# Patient Record
Sex: MALE | Race: WHITE | NOT HISPANIC OR LATINO | Employment: OTHER | URBAN - METROPOLITAN AREA
[De-identification: names, ages, dates, MRNs, and addresses within clinical notes are randomized per-mention and may not be internally consistent; named-entity substitution may affect disease eponyms.]

---

## 2018-07-23 ENCOUNTER — TRANSCRIBE ORDERS (OUTPATIENT)
Dept: ADMINISTRATIVE | Facility: HOSPITAL | Age: 83
End: 2018-07-23

## 2018-07-23 ENCOUNTER — APPOINTMENT (OUTPATIENT)
Dept: LAB | Facility: HOSPITAL | Age: 83
End: 2018-07-23
Attending: INTERNAL MEDICINE
Payer: COMMERCIAL

## 2018-07-23 DIAGNOSIS — I10 HYPERTENSION, UNSPECIFIED TYPE: ICD-10-CM

## 2018-07-23 DIAGNOSIS — I10 HYPERTENSION, UNSPECIFIED TYPE: Primary | ICD-10-CM

## 2018-07-23 DIAGNOSIS — E78.5 HYPERLIPIDEMIA, UNSPECIFIED HYPERLIPIDEMIA TYPE: ICD-10-CM

## 2018-07-23 LAB
ALBUMIN SERPL BCP-MCNC: 4 G/DL (ref 3.5–5.7)
ALP SERPL-CCNC: 48 U/L (ref 55–165)
ALT SERPL W P-5'-P-CCNC: 9 U/L (ref 7–52)
ANION GAP SERPL CALCULATED.3IONS-SCNC: 7 MMOL/L (ref 4–13)
AST SERPL W P-5'-P-CCNC: 13 U/L (ref 13–39)
BILIRUB SERPL-MCNC: 0.7 MG/DL (ref 0.2–1)
BUN SERPL-MCNC: 18 MG/DL (ref 7–25)
CALCIUM SERPL-MCNC: 9.2 MG/DL (ref 8.6–10.5)
CHLORIDE SERPL-SCNC: 105 MMOL/L (ref 98–107)
CHOLEST SERPL-MCNC: 267 MG/DL (ref 0–200)
CO2 SERPL-SCNC: 27 MMOL/L (ref 21–31)
CREAT SERPL-MCNC: 0.98 MG/DL (ref 0.7–1.3)
ERYTHROCYTE [DISTWIDTH] IN BLOOD BY AUTOMATED COUNT: 13.8 % (ref 11.6–15.1)
GFR SERPL CREATININE-BSD FRML MDRD: 71 ML/MIN/1.73SQ M
GLUCOSE P FAST SERPL-MCNC: 88 MG/DL (ref 65–99)
HCT VFR BLD AUTO: 46.8 % (ref 42–52)
HDLC SERPL-MCNC: 57 MG/DL (ref 40–60)
HGB BLD-MCNC: 15.9 G/DL (ref 12–17)
LDLC SERPL CALC-MCNC: 188 MG/DL (ref 0–100)
LG PLATELETS BLD QL SMEAR: PRESENT
MACROCYTES BLD QL AUTO: PRESENT
MCH RBC QN AUTO: 37.1 PG (ref 26.8–34.3)
MCHC RBC AUTO-ENTMCNC: 34.1 G/DL (ref 31.4–37.4)
MCV RBC AUTO: 109 FL (ref 82–98)
NONHDLC SERPL-MCNC: 210 MG/DL
PLATELET # BLD AUTO: 263 THOUSANDS/UL (ref 149–390)
PLATELET BLD QL SMEAR: ADEQUATE
PMV BLD AUTO: 7.3 FL (ref 8.9–12.7)
POTASSIUM SERPL-SCNC: 4.6 MMOL/L (ref 3.5–5.5)
PROT SERPL-MCNC: 6.6 G/DL (ref 6.4–8.9)
RBC # BLD AUTO: 4.3 MILLION/UL (ref 3.88–5.62)
RBC MORPH BLD: PRESENT
SODIUM SERPL-SCNC: 139 MMOL/L (ref 134–143)
TRIGL SERPL-MCNC: 111 MG/DL (ref 44–166)
WBC # BLD AUTO: 6.9 THOUSAND/UL (ref 4.31–10.16)

## 2018-07-23 PROCEDURE — 80053 COMPREHEN METABOLIC PANEL: CPT

## 2018-07-23 PROCEDURE — 80061 LIPID PANEL: CPT

## 2018-07-23 PROCEDURE — 36415 COLL VENOUS BLD VENIPUNCTURE: CPT

## 2018-07-23 PROCEDURE — 85027 COMPLETE CBC AUTOMATED: CPT

## 2018-09-12 ENCOUNTER — APPOINTMENT (OUTPATIENT)
Dept: LAB | Facility: HOSPITAL | Age: 83
End: 2018-09-12
Attending: INTERNAL MEDICINE
Payer: COMMERCIAL

## 2018-09-12 ENCOUNTER — TRANSCRIBE ORDERS (OUTPATIENT)
Dept: ADMINISTRATIVE | Facility: HOSPITAL | Age: 83
End: 2018-09-12

## 2018-09-12 DIAGNOSIS — N39.0 URINARY TRACT INFECTION WITHOUT HEMATURIA, SITE UNSPECIFIED: Primary | ICD-10-CM

## 2018-09-12 DIAGNOSIS — N39.0 URINARY TRACT INFECTION WITHOUT HEMATURIA, SITE UNSPECIFIED: ICD-10-CM

## 2018-09-12 LAB
BACTERIA UR QL AUTO: ABNORMAL /HPF
BILIRUB UR QL STRIP: NEGATIVE
CLARITY UR: ABNORMAL
COLOR UR: YELLOW
GLUCOSE UR STRIP-MCNC: NEGATIVE MG/DL
HGB UR QL STRIP.AUTO: ABNORMAL
KETONES UR STRIP-MCNC: NEGATIVE MG/DL
LEUKOCYTE ESTERASE UR QL STRIP: ABNORMAL
NITRITE UR QL STRIP: NEGATIVE
NON-SQ EPI CELLS URNS QL MICRO: ABNORMAL /HPF
PH UR STRIP.AUTO: 6 [PH] (ref 5–8)
PROT UR STRIP-MCNC: NEGATIVE MG/DL
RBC #/AREA URNS AUTO: ABNORMAL /HPF
SP GR UR STRIP.AUTO: 1.01 (ref 1–1.03)
UROBILINOGEN UR QL STRIP.AUTO: 0.2 E.U./DL
WBC #/AREA URNS AUTO: ABNORMAL /HPF

## 2018-09-12 PROCEDURE — 87186 SC STD MICRODIL/AGAR DIL: CPT

## 2018-09-12 PROCEDURE — 81001 URINALYSIS AUTO W/SCOPE: CPT | Performed by: INTERNAL MEDICINE

## 2018-09-12 PROCEDURE — 87077 CULTURE AEROBIC IDENTIFY: CPT

## 2018-09-12 PROCEDURE — 87086 URINE CULTURE/COLONY COUNT: CPT

## 2018-09-14 LAB — BACTERIA UR CULT: ABNORMAL

## 2018-09-27 ENCOUNTER — TRANSCRIBE ORDERS (OUTPATIENT)
Dept: ADMINISTRATIVE | Facility: HOSPITAL | Age: 83
End: 2018-09-27

## 2018-09-27 ENCOUNTER — APPOINTMENT (OUTPATIENT)
Dept: LAB | Facility: HOSPITAL | Age: 83
End: 2018-09-27
Attending: INTERNAL MEDICINE
Payer: COMMERCIAL

## 2018-09-27 DIAGNOSIS — I10 ESSENTIAL HYPERTENSION: ICD-10-CM

## 2018-09-27 DIAGNOSIS — N40.0 BENIGN PROSTATIC HYPERPLASIA, UNSPECIFIED WHETHER LOWER URINARY TRACT SYMPTOMS PRESENT: ICD-10-CM

## 2018-09-27 DIAGNOSIS — I10 ESSENTIAL HYPERTENSION: Primary | ICD-10-CM

## 2018-09-27 LAB
ALBUMIN SERPL BCP-MCNC: 3.8 G/DL (ref 3.5–5.7)
ALP SERPL-CCNC: 52 U/L (ref 55–165)
ALT SERPL W P-5'-P-CCNC: 10 U/L (ref 7–52)
ANION GAP SERPL CALCULATED.3IONS-SCNC: 8 MMOL/L (ref 4–13)
AST SERPL W P-5'-P-CCNC: 13 U/L (ref 13–39)
BASOPHILS # BLD AUTO: 0.1 THOUSANDS/ΜL (ref 0–0.1)
BASOPHILS NFR BLD AUTO: 1 % (ref 0–1)
BILIRUB SERPL-MCNC: 0.7 MG/DL (ref 0.2–1)
BUN SERPL-MCNC: 17 MG/DL (ref 7–25)
CALCIUM SERPL-MCNC: 9.2 MG/DL (ref 8.6–10.5)
CHLORIDE SERPL-SCNC: 104 MMOL/L (ref 98–107)
CO2 SERPL-SCNC: 25 MMOL/L (ref 21–31)
CREAT SERPL-MCNC: 0.98 MG/DL (ref 0.7–1.3)
EOSINOPHIL # BLD AUTO: 0.3 THOUSAND/ΜL (ref 0–0.61)
EOSINOPHIL NFR BLD AUTO: 5 % (ref 0–6)
ERYTHROCYTE [DISTWIDTH] IN BLOOD BY AUTOMATED COUNT: 13.5 % (ref 11.6–15.1)
GFR SERPL CREATININE-BSD FRML MDRD: 71 ML/MIN/1.73SQ M
GLUCOSE P FAST SERPL-MCNC: 93 MG/DL (ref 65–99)
HCT VFR BLD AUTO: 45.2 % (ref 42–52)
HGB BLD-MCNC: 15.3 G/DL (ref 12–17)
LYMPHOCYTES # BLD AUTO: 2.9 THOUSANDS/ΜL (ref 0.6–4.47)
LYMPHOCYTES NFR BLD AUTO: 43 % (ref 14–44)
MACROCYTES BLD QL AUTO: PRESENT
MCH RBC QN AUTO: 36.3 PG (ref 26.8–34.3)
MCHC RBC AUTO-ENTMCNC: 33.8 G/DL (ref 31.4–37.4)
MCV RBC AUTO: 107 FL (ref 82–98)
MONOCYTES # BLD AUTO: 0.5 THOUSAND/ΜL (ref 0.17–1.22)
MONOCYTES NFR BLD AUTO: 7 % (ref 4–12)
NEUTROPHILS # BLD AUTO: 3 THOUSANDS/ΜL (ref 1.85–7.62)
NEUTS SEG NFR BLD AUTO: 44 % (ref 43–75)
NRBC BLD AUTO-RTO: 0 /100 WBCS
PLATELET # BLD AUTO: 264 THOUSANDS/UL (ref 149–390)
PLATELET BLD QL SMEAR: ADEQUATE
PMV BLD AUTO: 7.2 FL (ref 8.9–12.7)
POTASSIUM SERPL-SCNC: 4 MMOL/L (ref 3.5–5.5)
PROT SERPL-MCNC: 6.5 G/DL (ref 6.4–8.9)
PSA SERPL-MCNC: 0.4 NG/ML (ref 0–4)
RBC # BLD AUTO: 4.21 MILLION/UL (ref 3.88–5.62)
RBC MORPH BLD: PRESENT
SODIUM SERPL-SCNC: 137 MMOL/L (ref 134–143)
WBC # BLD AUTO: 6.7 THOUSAND/UL (ref 4.31–10.16)

## 2018-09-27 PROCEDURE — 85025 COMPLETE CBC W/AUTO DIFF WBC: CPT

## 2018-09-27 PROCEDURE — 80053 COMPREHEN METABOLIC PANEL: CPT

## 2018-09-27 PROCEDURE — 84153 ASSAY OF PSA TOTAL: CPT

## 2018-09-27 PROCEDURE — 36415 COLL VENOUS BLD VENIPUNCTURE: CPT

## 2018-10-05 ENCOUNTER — APPOINTMENT (OUTPATIENT)
Dept: LAB | Facility: HOSPITAL | Age: 83
End: 2018-10-05
Attending: INTERNAL MEDICINE
Payer: COMMERCIAL

## 2018-10-05 ENCOUNTER — TRANSCRIBE ORDERS (OUTPATIENT)
Dept: ADMINISTRATIVE | Facility: HOSPITAL | Age: 83
End: 2018-10-05

## 2018-10-05 DIAGNOSIS — N39.0 URINARY TRACT INFECTION WITHOUT HEMATURIA, SITE UNSPECIFIED: Primary | ICD-10-CM

## 2018-10-05 DIAGNOSIS — N39.0 URINARY TRACT INFECTION WITHOUT HEMATURIA, SITE UNSPECIFIED: ICD-10-CM

## 2018-10-05 LAB
BACTERIA UR QL AUTO: ABNORMAL /HPF
BILIRUB UR QL STRIP: NEGATIVE
CLARITY UR: CLEAR
COLOR UR: YELLOW
GLUCOSE UR STRIP-MCNC: NEGATIVE MG/DL
HGB UR QL STRIP.AUTO: ABNORMAL
KETONES UR STRIP-MCNC: NEGATIVE MG/DL
LEUKOCYTE ESTERASE UR QL STRIP: NEGATIVE
NITRITE UR QL STRIP: NEGATIVE
NON-SQ EPI CELLS URNS QL MICRO: ABNORMAL /HPF
PH UR STRIP.AUTO: 5.5 [PH] (ref 5–8)
PROT UR STRIP-MCNC: NEGATIVE MG/DL
RBC #/AREA URNS AUTO: ABNORMAL /HPF
SP GR UR STRIP.AUTO: 1.02 (ref 1–1.03)
UROBILINOGEN UR QL STRIP.AUTO: 0.2 E.U./DL
WBC #/AREA URNS AUTO: ABNORMAL /HPF

## 2018-10-05 PROCEDURE — 87086 URINE CULTURE/COLONY COUNT: CPT

## 2018-10-05 PROCEDURE — 81001 URINALYSIS AUTO W/SCOPE: CPT

## 2018-10-06 LAB — BACTERIA UR CULT: NORMAL

## 2019-03-08 ENCOUNTER — TRANSCRIBE ORDERS (OUTPATIENT)
Dept: ADMINISTRATIVE | Facility: HOSPITAL | Age: 84
End: 2019-03-08

## 2019-03-08 ENCOUNTER — APPOINTMENT (OUTPATIENT)
Dept: LAB | Facility: HOSPITAL | Age: 84
End: 2019-03-08
Attending: INTERNAL MEDICINE
Payer: COMMERCIAL

## 2019-03-08 DIAGNOSIS — I10 BENIGN HYPERTENSION: Primary | ICD-10-CM

## 2019-03-08 DIAGNOSIS — I10 BENIGN HYPERTENSION: ICD-10-CM

## 2019-03-08 DIAGNOSIS — E55.9 VITAMIN D DEFICIENCY: ICD-10-CM

## 2019-03-08 LAB
ALBUMIN SERPL BCP-MCNC: 4 G/DL (ref 3.5–5.7)
ALP SERPL-CCNC: 58 U/L (ref 55–165)
ALT SERPL W P-5'-P-CCNC: 8 U/L (ref 7–52)
ANION GAP SERPL CALCULATED.3IONS-SCNC: 10 MMOL/L (ref 4–13)
AST SERPL W P-5'-P-CCNC: 12 U/L (ref 13–39)
BILIRUB SERPL-MCNC: 0.7 MG/DL (ref 0.2–1)
BUN SERPL-MCNC: 17 MG/DL (ref 7–25)
CALCIUM SERPL-MCNC: 9.5 MG/DL (ref 8.6–10.5)
CHLORIDE SERPL-SCNC: 105 MMOL/L (ref 98–107)
CHOLEST SERPL-MCNC: 261 MG/DL (ref 0–200)
CO2 SERPL-SCNC: 24 MMOL/L (ref 21–31)
CREAT SERPL-MCNC: 0.94 MG/DL (ref 0.7–1.3)
ERYTHROCYTE [DISTWIDTH] IN BLOOD BY AUTOMATED COUNT: 13.8 % (ref 11.6–15.1)
GFR SERPL CREATININE-BSD FRML MDRD: 75 ML/MIN/1.73SQ M
GLUCOSE SERPL-MCNC: 92 MG/DL (ref 65–140)
HCT VFR BLD AUTO: 46.5 % (ref 42–52)
HDLC SERPL-MCNC: 64 MG/DL (ref 40–60)
HGB BLD-MCNC: 15.8 G/DL (ref 12–17)
LDLC SERPL CALC-MCNC: 181 MG/DL (ref 0–100)
MACROCYTES BLD QL AUTO: PRESENT
MCH RBC QN AUTO: 36.7 PG (ref 26.8–34.3)
MCHC RBC AUTO-ENTMCNC: 34.1 G/DL (ref 31.4–37.4)
MCV RBC AUTO: 108 FL (ref 82–98)
NONHDLC SERPL-MCNC: 197 MG/DL
PLATELET # BLD AUTO: 245 THOUSANDS/UL (ref 149–390)
PLATELET BLD QL SMEAR: ADEQUATE
PMV BLD AUTO: 7.8 FL (ref 8.9–12.7)
POTASSIUM SERPL-SCNC: 4.2 MMOL/L (ref 3.5–5.5)
PROT SERPL-MCNC: 7 G/DL (ref 6.4–8.9)
RBC # BLD AUTO: 4.32 MILLION/UL (ref 3.88–5.62)
RBC MORPH BLD: PRESENT
SODIUM SERPL-SCNC: 139 MMOL/L (ref 134–143)
TRIGL SERPL-MCNC: 82 MG/DL (ref 44–166)
WBC # BLD AUTO: 7 THOUSAND/UL (ref 4.31–10.16)

## 2019-03-08 PROCEDURE — 36415 COLL VENOUS BLD VENIPUNCTURE: CPT

## 2019-03-08 PROCEDURE — 82306 VITAMIN D 25 HYDROXY: CPT

## 2019-03-08 PROCEDURE — 85027 COMPLETE CBC AUTOMATED: CPT

## 2019-03-08 PROCEDURE — 80061 LIPID PANEL: CPT

## 2019-03-08 PROCEDURE — 80053 COMPREHEN METABOLIC PANEL: CPT

## 2019-03-13 LAB
25(OH)D2 SERPL-MCNC: <1 NG/ML
25(OH)D3 SERPL-MCNC: 38 NG/ML
25(OH)D3+25(OH)D2 SERPL-MCNC: 38 NG/ML

## 2019-06-21 ENCOUNTER — TRANSCRIBE ORDERS (OUTPATIENT)
Dept: ADMINISTRATIVE | Facility: HOSPITAL | Age: 84
End: 2019-06-21

## 2019-06-21 ENCOUNTER — APPOINTMENT (OUTPATIENT)
Dept: LAB | Facility: HOSPITAL | Age: 84
End: 2019-06-21
Attending: INTERNAL MEDICINE
Payer: COMMERCIAL

## 2019-06-21 DIAGNOSIS — I10 ESSENTIAL HYPERTENSION: ICD-10-CM

## 2019-06-21 DIAGNOSIS — D64.9 ANEMIA, UNSPECIFIED TYPE: Primary | ICD-10-CM

## 2019-06-21 DIAGNOSIS — D64.9 ANEMIA, UNSPECIFIED TYPE: ICD-10-CM

## 2019-06-21 LAB
ALBUMIN SERPL BCP-MCNC: 3.7 G/DL (ref 3.5–5.7)
ALP SERPL-CCNC: 52 U/L (ref 55–165)
ALT SERPL W P-5'-P-CCNC: 7 U/L (ref 7–52)
ANION GAP SERPL CALCULATED.3IONS-SCNC: 8 MMOL/L (ref 4–13)
AST SERPL W P-5'-P-CCNC: 10 U/L (ref 13–39)
BASOPHILS # BLD AUTO: 0.1 THOUSANDS/ΜL (ref 0–0.1)
BASOPHILS NFR BLD AUTO: 1 % (ref 0–1)
BILIRUB SERPL-MCNC: 0.7 MG/DL (ref 0.2–1)
BUN SERPL-MCNC: 18 MG/DL (ref 7–25)
CALCIUM SERPL-MCNC: 9.4 MG/DL (ref 8.6–10.5)
CHLORIDE SERPL-SCNC: 104 MMOL/L (ref 98–107)
CO2 SERPL-SCNC: 25 MMOL/L (ref 21–31)
CREAT SERPL-MCNC: 0.91 MG/DL (ref 0.7–1.3)
EOSINOPHIL # BLD AUTO: 0.2 THOUSAND/ΜL (ref 0–0.61)
EOSINOPHIL NFR BLD AUTO: 4 % (ref 0–6)
ERYTHROCYTE [DISTWIDTH] IN BLOOD BY AUTOMATED COUNT: 13.9 % (ref 11.6–15.1)
GFR SERPL CREATININE-BSD FRML MDRD: 78 ML/MIN/1.73SQ M
GLUCOSE SERPL-MCNC: 104 MG/DL (ref 65–140)
HCT VFR BLD AUTO: 43.3 % (ref 42–52)
HGB BLD-MCNC: 15 G/DL (ref 12–17)
LYMPHOCYTES # BLD AUTO: 2.5 THOUSANDS/ΜL (ref 0.6–4.47)
LYMPHOCYTES NFR BLD AUTO: 39 % (ref 14–44)
MACROCYTES BLD QL AUTO: PRESENT
MCH RBC QN AUTO: 37 PG (ref 26.8–34.3)
MCHC RBC AUTO-ENTMCNC: 34.7 G/DL (ref 31.4–37.4)
MCV RBC AUTO: 107 FL (ref 82–98)
MONOCYTES # BLD AUTO: 0.5 THOUSAND/ΜL (ref 0.17–1.22)
MONOCYTES NFR BLD AUTO: 7 % (ref 4–12)
NEUTROPHILS # BLD AUTO: 3.2 THOUSANDS/ΜL (ref 1.85–7.62)
NEUTS SEG NFR BLD AUTO: 50 % (ref 43–75)
PLATELET # BLD AUTO: 261 THOUSANDS/UL (ref 149–390)
PLATELET BLD QL SMEAR: ADEQUATE
PMV BLD AUTO: 7.4 FL (ref 8.9–12.7)
POTASSIUM SERPL-SCNC: 4.1 MMOL/L (ref 3.5–5.5)
PROT SERPL-MCNC: 6.7 G/DL (ref 6.4–8.9)
RBC # BLD AUTO: 4.06 MILLION/UL (ref 3.88–5.62)
RBC MORPH BLD: PRESENT
SODIUM SERPL-SCNC: 137 MMOL/L (ref 134–143)
STOMATOCYTES BLD QL SMEAR: PRESENT
VIT B12 SERPL-MCNC: 690 PG/ML (ref 100–900)
WBC # BLD AUTO: 6.4 THOUSAND/UL (ref 4.31–10.16)

## 2019-06-21 PROCEDURE — 85025 COMPLETE CBC W/AUTO DIFF WBC: CPT

## 2019-06-21 PROCEDURE — 80053 COMPREHEN METABOLIC PANEL: CPT

## 2019-06-21 PROCEDURE — 36415 COLL VENOUS BLD VENIPUNCTURE: CPT

## 2019-06-21 PROCEDURE — 82607 VITAMIN B-12: CPT

## 2019-09-20 ENCOUNTER — APPOINTMENT (OUTPATIENT)
Dept: LAB | Facility: HOSPITAL | Age: 84
End: 2019-09-20
Attending: INTERNAL MEDICINE
Payer: COMMERCIAL

## 2019-09-20 ENCOUNTER — TRANSCRIBE ORDERS (OUTPATIENT)
Dept: ADMINISTRATIVE | Facility: HOSPITAL | Age: 84
End: 2019-09-20

## 2019-09-20 DIAGNOSIS — I10 ESSENTIAL HYPERTENSION: Primary | ICD-10-CM

## 2019-09-20 DIAGNOSIS — E78.2 MIXED HYPERLIPIDEMIA: ICD-10-CM

## 2019-09-20 DIAGNOSIS — I10 ESSENTIAL HYPERTENSION: ICD-10-CM

## 2019-09-20 LAB
ALBUMIN SERPL BCP-MCNC: 3.5 G/DL (ref 3.5–5)
ALP SERPL-CCNC: 57 U/L (ref 46–116)
ALT SERPL W P-5'-P-CCNC: 13 U/L (ref 12–78)
ANION GAP SERPL CALCULATED.3IONS-SCNC: 4 MMOL/L (ref 4–13)
AST SERPL W P-5'-P-CCNC: 10 U/L (ref 5–45)
BASOPHILS # BLD AUTO: 0.06 THOUSANDS/ΜL (ref 0–0.1)
BASOPHILS NFR BLD AUTO: 1 % (ref 0–1)
BILIRUB SERPL-MCNC: 0.68 MG/DL (ref 0.2–1)
BUN SERPL-MCNC: 19 MG/DL (ref 5–25)
CALCIUM SERPL-MCNC: 8.9 MG/DL (ref 8.3–10.1)
CHLORIDE SERPL-SCNC: 108 MMOL/L (ref 100–108)
CHOLEST SERPL-MCNC: 231 MG/DL (ref 50–200)
CO2 SERPL-SCNC: 25 MMOL/L (ref 21–32)
CREAT SERPL-MCNC: 0.84 MG/DL (ref 0.6–1.3)
EOSINOPHIL # BLD AUTO: 0.35 THOUSAND/ΜL (ref 0–0.61)
EOSINOPHIL NFR BLD AUTO: 5 % (ref 0–6)
ERYTHROCYTE [DISTWIDTH] IN BLOOD BY AUTOMATED COUNT: 13.6 % (ref 11.6–15.1)
GFR SERPL CREATININE-BSD FRML MDRD: 80 ML/MIN/1.73SQ M
GLUCOSE P FAST SERPL-MCNC: 87 MG/DL (ref 65–99)
HCT VFR BLD AUTO: 42.9 % (ref 36.5–49.3)
HDLC SERPL-MCNC: 54 MG/DL (ref 40–60)
HGB BLD-MCNC: 14.5 G/DL (ref 12–17)
IMM GRANULOCYTES # BLD AUTO: 0.01 THOUSAND/UL (ref 0–0.2)
IMM GRANULOCYTES NFR BLD AUTO: 0 % (ref 0–2)
LDLC SERPL CALC-MCNC: 158 MG/DL (ref 0–100)
LYMPHOCYTES # BLD AUTO: 3.04 THOUSANDS/ΜL (ref 0.6–4.47)
LYMPHOCYTES NFR BLD AUTO: 42 % (ref 14–44)
MCH RBC QN AUTO: 36.3 PG (ref 26.8–34.3)
MCHC RBC AUTO-ENTMCNC: 33.8 G/DL (ref 31.4–37.4)
MCV RBC AUTO: 108 FL (ref 82–98)
MONOCYTES # BLD AUTO: 0.48 THOUSAND/ΜL (ref 0.17–1.22)
MONOCYTES NFR BLD AUTO: 7 % (ref 4–12)
NEUTROPHILS # BLD AUTO: 3.28 THOUSANDS/ΜL (ref 1.85–7.62)
NEUTS SEG NFR BLD AUTO: 45 % (ref 43–75)
NONHDLC SERPL-MCNC: 177 MG/DL
NRBC BLD AUTO-RTO: 0 /100 WBCS
PLATELET # BLD AUTO: 238 THOUSANDS/UL (ref 149–390)
PMV BLD AUTO: 10 FL (ref 8.9–12.7)
POTASSIUM SERPL-SCNC: 3.9 MMOL/L (ref 3.5–5.3)
PROT SERPL-MCNC: 7.3 G/DL (ref 6.4–8.2)
RBC # BLD AUTO: 3.99 MILLION/UL (ref 3.88–5.62)
SODIUM SERPL-SCNC: 137 MMOL/L (ref 136–145)
TRIGL SERPL-MCNC: 95 MG/DL
WBC # BLD AUTO: 7.22 THOUSAND/UL (ref 4.31–10.16)

## 2019-09-20 PROCEDURE — 80061 LIPID PANEL: CPT

## 2019-09-20 PROCEDURE — 80053 COMPREHEN METABOLIC PANEL: CPT

## 2019-09-20 PROCEDURE — 36415 COLL VENOUS BLD VENIPUNCTURE: CPT

## 2019-09-20 PROCEDURE — 85025 COMPLETE CBC W/AUTO DIFF WBC: CPT

## 2024-03-05 ENCOUNTER — APPOINTMENT (OUTPATIENT)
Dept: RADIOLOGY | Facility: CLINIC | Age: 89
End: 2024-03-05
Payer: COMMERCIAL

## 2024-03-05 ENCOUNTER — APPOINTMENT (OUTPATIENT)
Dept: LAB | Facility: CLINIC | Age: 89
End: 2024-03-05
Payer: COMMERCIAL

## 2024-03-05 DIAGNOSIS — M54.50 LOW BACK PAIN, UNSPECIFIED BACK PAIN LATERALITY, UNSPECIFIED CHRONICITY, UNSPECIFIED WHETHER SCIATICA PRESENT: ICD-10-CM

## 2024-03-05 DIAGNOSIS — I10 ESSENTIAL HYPERTENSION, MALIGNANT: ICD-10-CM
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
## 2024-03-05 DIAGNOSIS — I25.10 ATHEROSCLEROSIS OF NATIVE CORONARY ARTERY, UNSPECIFIED WHETHER ANGINA PRESENT, UNSPECIFIED WHETHER NATIVE OR TRANSPLANTED HEART: ICD-10-CM

## 2024-03-05 LAB
ALBUMIN SERPL BCP-MCNC: 4.1 G/DL (ref 3.5–5)
ALP SERPL-CCNC: 88 U/L (ref 34–104)
ALT SERPL W P-5'-P-CCNC: 8 U/L (ref 7–52)
ANION GAP SERPL CALCULATED.3IONS-SCNC: 11 MMOL/L
AST SERPL W P-5'-P-CCNC: 20 U/L (ref 13–39)
BASOPHILS # BLD AUTO: 0.06 THOUSANDS/ÂΜL (ref 0–0.1)
BASOPHILS NFR BLD AUTO: 1 % (ref 0–1)
BILIRUB SERPL-MCNC: 0.79 MG/DL (ref 0.2–1)
BUN SERPL-MCNC: 21 MG/DL (ref 5–25)
CALCIUM SERPL-MCNC: 10.1 MG/DL (ref 8.4–10.2)
CHLORIDE SERPL-SCNC: 100 MMOL/L (ref 96–108)
CO2 SERPL-SCNC: 25 MMOL/L (ref 21–32)
CREAT SERPL-MCNC: 1.29 MG/DL (ref 0.6–1.3)
EOSINOPHIL # BLD AUTO: 0.2 THOUSAND/ÂΜL (ref 0–0.61)
EOSINOPHIL NFR BLD AUTO: 3 % (ref 0–6)
ERYTHROCYTE [DISTWIDTH] IN BLOOD BY AUTOMATED COUNT: 14.5 % (ref 11.6–15.1)
GFR SERPL CREATININE-BSD FRML MDRD: 49 ML/MIN/1.73SQ M
GLUCOSE SERPL-MCNC: 82 MG/DL (ref 65–140)
HCT VFR BLD AUTO: 47.9 % (ref 36.5–49.3)
HGB BLD-MCNC: 16.4 G/DL (ref 12–17)
IMM GRANULOCYTES # BLD AUTO: 0.03 THOUSAND/UL (ref 0–0.2)
IMM GRANULOCYTES NFR BLD AUTO: 0 % (ref 0–2)
LYMPHOCYTES # BLD AUTO: 2.66 THOUSANDS/ÂΜL (ref 0.6–4.47)
LYMPHOCYTES NFR BLD AUTO: 33 % (ref 14–44)
MCH RBC QN AUTO: 39 PG (ref 26.8–34.3)
MCHC RBC AUTO-ENTMCNC: 34.2 G/DL (ref 31.4–37.4)
MCV RBC AUTO: 114 FL (ref 82–98)
MONOCYTES # BLD AUTO: 0.59 THOUSAND/ÂΜL (ref 0.17–1.22)
MONOCYTES NFR BLD AUTO: 7 % (ref 4–12)
NEUTROPHILS # BLD AUTO: 4.61 THOUSANDS/ÂΜL (ref 1.85–7.62)
NEUTS SEG NFR BLD AUTO: 56 % (ref 43–75)
NRBC BLD AUTO-RTO: 0 /100 WBCS
PLATELET # BLD AUTO: 339 THOUSANDS/UL (ref 149–390)
PMV BLD AUTO: 9.6 FL (ref 8.9–12.7)
POTASSIUM SERPL-SCNC: 3.9 MMOL/L (ref 3.5–5.3)
PROT SERPL-MCNC: 7.4 G/DL (ref 6.4–8.4)
RBC # BLD AUTO: 4.21 MILLION/UL (ref 3.88–5.62)
SODIUM SERPL-SCNC: 136 MMOL/L (ref 135–147)
WBC # BLD AUTO: 8.15 THOUSAND/UL (ref 4.31–10.16)

## 2024-03-05 PROCEDURE — 72100 X-RAY EXAM L-S SPINE 2/3 VWS: CPT

## 2024-03-05 PROCEDURE — 73523 X-RAY EXAM HIPS BI 5/> VIEWS: CPT

## 2024-03-05 PROCEDURE — 36415 COLL VENOUS BLD VENIPUNCTURE: CPT

## 2024-03-05 PROCEDURE — 80053 COMPREHEN METABOLIC PANEL: CPT

## 2024-03-05 PROCEDURE — 85025 COMPLETE CBC W/AUTO DIFF WBC: CPT

## 2024-03-20 PROCEDURE — 88311 DECALCIFY TISSUE: CPT | Performed by: PATHOLOGY

## 2024-03-20 PROCEDURE — 88341 IMHCHEM/IMCYTCHM EA ADD ANTB: CPT | Performed by: PATHOLOGY

## 2024-03-20 PROCEDURE — 88365 INSITU HYBRIDIZATION (FISH): CPT | Performed by: PATHOLOGY

## 2024-03-20 PROCEDURE — 88307 TISSUE EXAM BY PATHOLOGIST: CPT | Performed by: PATHOLOGY

## 2024-03-20 PROCEDURE — 88342 IMHCHEM/IMCYTCHM 1ST ANTB: CPT | Performed by: PATHOLOGY

## 2024-03-20 PROCEDURE — 88364 INSITU HYBRIDIZATION (FISH): CPT | Performed by: PATHOLOGY

## 2024-03-21 ENCOUNTER — LAB REQUISITION (OUTPATIENT)
Dept: LAB | Facility: HOSPITAL | Age: 89
End: 2024-03-21
Payer: COMMERCIAL

## 2024-03-21 DIAGNOSIS — M48.50XA COLLAPSED VERTEBRA, NOT ELSEWHERE CLASSIFIED, SITE UNSPECIFIED, INITIAL ENCOUNTER FOR FRACTURE (HCC): ICD-10-CM

## 2024-09-03 ENCOUNTER — EVALUATION (OUTPATIENT)
Dept: PHYSICAL THERAPY | Facility: CLINIC | Age: 89
End: 2024-09-03
Payer: COMMERCIAL

## 2024-09-03 DIAGNOSIS — M54.50 LOW BACK PAIN WITHOUT SCIATICA, UNSPECIFIED BACK PAIN LATERALITY, UNSPECIFIED CHRONICITY: Primary | ICD-10-CM

## 2024-09-03 PROCEDURE — 97162 PT EVAL MOD COMPLEX 30 MIN: CPT | Performed by: PHYSICAL THERAPIST

## 2024-09-03 PROCEDURE — 97110 THERAPEUTIC EXERCISES: CPT | Performed by: PHYSICAL THERAPIST

## 2024-09-03 PROCEDURE — 97112 NEUROMUSCULAR REEDUCATION: CPT | Performed by: PHYSICAL THERAPIST

## 2024-09-03 NOTE — PROGRESS NOTES
PT Evaluation     Today's date: 9/3/2024  Patient name: Denton Manuel  : 1935  MRN: [id number]  Referring provider: Kayleigh Jovel DO Dx:   Encounter Diagnosis     ICD-10-CM    1. Low back pain without sciatica, unspecified back pain laterality, unspecified chronicity  M54.50                      Assessment  Impairments: activity intolerance, impaired physical strength and pain with function    Assessment details: Denton Manuel is a 89 y.o. year old male presenting to PT with pain, decreased range of motion, decreased strength, and decreased tolerance to activity. Signs and symptoms are consistent with referring diagnosis of low back pain.  The existing impairments result in difficulty with prolonged activity and maintaining balance while standing. Denton would benefit from skilled PT services to address these issues and to maximize function.  Home exercise provided and all questions answered. Thank you for the referral.      Goals      SHORT TERM GOALS (2-4 WEEKS)    Increase lumbar spine AROM 25-50% in limited planes   Improve sitting posture and body mechanics.   Standing/ADL tolerance >30 minutes.          LONG TERM GOALS (DISCHARGE)    Demonstrate lumbar rotation >75%  Decrease pain to <2/10 with activity  Independent with HEP        Plan    Planned therapy interventions: neuromuscular re-education, motor coordination training, postural training, strengthening, stretching, therapeutic activities and therapeutic exercise    Frequency: 2x week  Duration in weeks: 6        Subjective Evaluation    History of Present Illness  Mechanism of injury: Denton presents to PT with his family with complaints of low back pain that radiates into both legs.  His pain is not constant, but sometimes prevents him from participating in gardening, riding .      Overall he is less active than he once was and has experienced some deconditioning. He can stand for 10-15 minutes but will lean forward to support  himself if possible.    Denton sustained at least 3 falls in the past year, two while walking outside and one in the bathroom.  He underwent kyphoplasty in March following the most recent fall to address T12 compression fx.  Additional MRI shows L4-5 nerve compression bilaterally.     If PT does not address his symptoms, he may pursue nerve ablation.   Patient Goals  Patient goals for therapy: decreased pain    Social Support  Stairs in house: yes (1 flight to basement)   Lives in: multiple-level home  Lives with: adult children      Diagnostic Tests  X-ray: abnormal  MRI studies: abnormal  Treatments  Current treatment: physical therapy        Objective     Strength/Myotome Testing     Left Hip   Planes of Motion   Flexion: 4+  Abduction: 4+  Adduction: 4+    Right Hip   Planes of Motion   Flexion: 4+  Abduction: 4+  Adduction: 4+    Left Knee   Flexion: 4+  Extension: 4+    Right Knee   Flexion: 4+  Extension: 4+    Left Ankle/Foot   Dorsiflexion: 4-  Plantar flexion: 4    Right Ankle/Foot   Dorsiflexion: 4-  Plantar flexion: 4    General Comments:      Lumbar Comments  Tandem balance    R:  >5 second  L:  < 3 seconds             Precautions: fall risk        Manuals 9/3                                                                Neuro Re-Ed             PPT 10:x10            HR/TR 20x ea            Side step             Tandem amb             Bridge             LTR                          Ther Ex             Bike             TB Standing Row, Ext             Leg Press                                                                              Ther Activity                                       Gait Training                                       Modalities

## 2024-09-05 ENCOUNTER — OFFICE VISIT (OUTPATIENT)
Dept: PHYSICAL THERAPY | Facility: CLINIC | Age: 89
End: 2024-09-05
Payer: COMMERCIAL

## 2024-09-05 DIAGNOSIS — M54.50 LOW BACK PAIN WITHOUT SCIATICA, UNSPECIFIED BACK PAIN LATERALITY, UNSPECIFIED CHRONICITY: Primary | ICD-10-CM

## 2024-09-05 PROCEDURE — 97110 THERAPEUTIC EXERCISES: CPT

## 2024-09-05 PROCEDURE — 97112 NEUROMUSCULAR REEDUCATION: CPT

## 2024-09-05 NOTE — PROGRESS NOTES
"Daily Note     Today's date: 2024  Patient name: Denton Manuel  : 1935  MRN: [id number]  Referring provider: Kayleigh Jovel DO  Dx:   Encounter Diagnosis     ICD-10-CM    1. Low back pain without sciatica, unspecified back pain laterality, unspecified chronicity  M54.50                      Subjective: Pt performing HEP with good tolerance and denies increase symptoms after 1st visit.  Pt continues to report intermittent low back and b/l LE pain (posterior to knees) with activity.       Objective: See treatment diary below      Assessment: Tolerated treatment well. Good tolerance to exercise as noted with verbal/tactile cues provided for proper technique and posture.  Mild posterior LOB noted with standing theraband exercise requiring minimal CG assist.  Pt denied pain with exercise. Patient would benefit from continued PT      Plan: Continue per plan of care.      Precautions: fall risk        Manuals 9/3 9/5                                                               Neuro Re-Ed             PPT 10:x10 10\" x10           HR/TR 20x ea GH           Side step  20 ft x4           Tandem amb  20 ft x4           Bridge  5\"x10           LTR  10\"x 10 each                        Ther Ex             Bike  nv           TB Standing Row, Ext  RTB 5\"x 10 each           Leg Press  60# 2x10                                                                            Ther Activity                                       Gait Training                                       Modalities                                            "

## 2024-09-09 ENCOUNTER — OFFICE VISIT (OUTPATIENT)
Dept: PHYSICAL THERAPY | Facility: CLINIC | Age: 89
End: 2024-09-09
Payer: COMMERCIAL

## 2024-09-09 DIAGNOSIS — M54.50 LOW BACK PAIN WITHOUT SCIATICA, UNSPECIFIED BACK PAIN LATERALITY, UNSPECIFIED CHRONICITY: Primary | ICD-10-CM

## 2024-09-09 PROCEDURE — 97112 NEUROMUSCULAR REEDUCATION: CPT

## 2024-09-09 PROCEDURE — 97110 THERAPEUTIC EXERCISES: CPT

## 2024-09-09 NOTE — PROGRESS NOTES
"Daily Note     Today's date: 2024  Patient name: Denton Manuel  : 1935  MRN: [id number]  Referring provider: Kayleigh Jovel DO  Dx:   Encounter Diagnosis     ICD-10-CM    1. Low back pain without sciatica, unspecified back pain laterality, unspecified chronicity  M54.50                      Subjective: Pt performing HEP with good tolerance and denies increase symptoms after last visit. No new c/o's       Objective: See treatment diary below      Assessment: Tolerated treatment well. Good tolerance to progression of strengthening/stability exercise as noted with verbal/tactile cues provided for proper technique and posture.  Minimal assist required with gait/stability exercise.  Pt denied pain with exercise. Patient would benefit from continued PT      Plan: Continue per plan of care.      Precautions: fall risk        Manuals 9/3 9/5 9/9                                                              Neuro Re-Ed             PPT 10:x10 10\" x10 GH          HR/TR 20x ea GH HEP          Side step  20 ft x4 RTB 20 ft x4          Tandem amb  20 ft x4 GH          Bridge  5\"x10 10\"x 10          LTR  10\"x 10 each GH                       Ther Ex             Bike  nv 5'           TB Standing Row, Ext  RTB 5\"x 10 each GTB 5\"x10 each          Leg Press  60# 2x10 80# 2x10                                                                           Ther Activity                                       Gait Training                                       Modalities                                            "

## 2024-09-11 ENCOUNTER — OFFICE VISIT (OUTPATIENT)
Dept: PHYSICAL THERAPY | Facility: CLINIC | Age: 89
End: 2024-09-11
Payer: COMMERCIAL

## 2024-09-11 DIAGNOSIS — M54.50 LOW BACK PAIN WITHOUT SCIATICA, UNSPECIFIED BACK PAIN LATERALITY, UNSPECIFIED CHRONICITY: Primary | ICD-10-CM

## 2024-09-11 PROCEDURE — 97112 NEUROMUSCULAR REEDUCATION: CPT | Performed by: PHYSICAL THERAPIST

## 2024-09-11 PROCEDURE — 97110 THERAPEUTIC EXERCISES: CPT | Performed by: PHYSICAL THERAPIST

## 2024-09-11 NOTE — PROGRESS NOTES
"Daily Note     Today's date: 2024  Patient name: Denton Manuel  : 1935  MRN: [id number]  Referring provider: Kayleigh Jovel DO  Dx:   Encounter Diagnosis     ICD-10-CM    1. Low back pain without sciatica, unspecified back pain laterality, unspecified chronicity  M54.50                      Subjective: Denton reports mild low back pain today.       Objective: See treatment diary below      Assessment: Tolerated treatment well. Patient would benefit from continued PT      Plan: Continue per plan of care.      Precautions: fall risk        Manuals 9/3 9/5 9/9 9/11                                                             Neuro Re-Ed             PPT 10:x10 10\" x10 GH          HR/TR 20x ea GH HEP          Side step  20 ft x4 RTB 20 ft x4 GTB 4x         Tandem amb  20 ft x4 GH 4x         Bridge  5\"x10 10\"x 10 JL         LTR  10\"x 10 each GH JL         Clamshell    BTB 2x10         Ther Ex             Bike  nv 5'  6'         TB Standing Row, Ext  RTB 5\"x 10 each GTB 5\"x10 each BTB 5:x15         Leg Press  60# 2x10 80# 2x10 80# 3x10                                                                          Ther Activity                                       Gait Training                                       Modalities                                                   "

## 2024-09-16 ENCOUNTER — OFFICE VISIT (OUTPATIENT)
Dept: PHYSICAL THERAPY | Facility: CLINIC | Age: 89
End: 2024-09-16
Payer: COMMERCIAL

## 2024-09-16 DIAGNOSIS — M54.50 LOW BACK PAIN WITHOUT SCIATICA, UNSPECIFIED BACK PAIN LATERALITY, UNSPECIFIED CHRONICITY: Primary | ICD-10-CM

## 2024-09-16 PROCEDURE — 97110 THERAPEUTIC EXERCISES: CPT

## 2024-09-16 PROCEDURE — 97112 NEUROMUSCULAR REEDUCATION: CPT

## 2024-09-16 NOTE — PROGRESS NOTES
"Daily Note     Today's date: 2024  Patient name: Denton Manuel  : 1935  MRN: [id number]  Referring provider: Kayleigh Jovel DO  Dx:   Encounter Diagnosis     ICD-10-CM    1. Low back pain without sciatica, unspecified back pain laterality, unspecified chronicity  M54.50                      Subjective: Denton reports some improvement overall with his strength and balance since starting therapy.       Objective: See treatment diary below.  Pt seen x5 visits with FOTO outcome measure 35 at IE and 83 this visit.       Assessment: Tolerated treatment well.  Good tolerance to progression of strengthening and balance activity with no c/o's.  Verbal/tactile cues provided with exercise for proper technique.  Minimal CG assist required with balance activity.  Patient would benefit from continued PT      Plan: Continue per plan of care.      Precautions: fall risk        Manuals 9/3 9/5 9/9 9/11 9/16                                                            Neuro Re-Ed             PPT 10:x10 10\" x10 GH          HR/TR 20x ea GH HEP          Side step  20 ft x4 RTB 20 ft x4 GTB 4x GH        Tandem amb  20 ft x4 GH 4x GH        Bridge  5\"x10 10\"x 10 JL GH        LTR  10\"x 10 each GH JL GH        Clamshell    BTB 2x10 GH        Biodex - PS      L12-10 30\"x5        Ther Ex             Bike  nv 5'  6' 7' L1        TB Standing Row, Ext  RTB 5\"x 10 each GTB 5\"x10 each BTB 5:x15 BTB x20 each        Leg Press DL  60# 2x10 80# 2x10 80# 3x10 95# 2x10                                                                         Ther Activity                                       Gait Training                                       Modalities                                                   "

## 2024-09-19 ENCOUNTER — OFFICE VISIT (OUTPATIENT)
Dept: PHYSICAL THERAPY | Facility: CLINIC | Age: 89
End: 2024-09-19
Payer: COMMERCIAL

## 2024-09-19 DIAGNOSIS — M54.50 LOW BACK PAIN WITHOUT SCIATICA, UNSPECIFIED BACK PAIN LATERALITY, UNSPECIFIED CHRONICITY: Primary | ICD-10-CM

## 2024-09-19 PROCEDURE — 97530 THERAPEUTIC ACTIVITIES: CPT

## 2024-09-19 PROCEDURE — 97112 NEUROMUSCULAR REEDUCATION: CPT

## 2024-09-19 PROCEDURE — 97110 THERAPEUTIC EXERCISES: CPT

## 2024-09-19 NOTE — PROGRESS NOTES
"Daily Note     Today's date: 2024  Patient name: Denton Manuel  : 1935  MRN: [id number]  Referring provider: Kayleigh Jovel DO  Dx:   Encounter Diagnosis     ICD-10-CM    1. Low back pain without sciatica, unspecified back pain laterality, unspecified chronicity  M54.50                      Subjective: Denton reports continued improvement overall with his strength and balance since starting therapy.   Pt reports feeling good today with no c/o LBP.        Objective: See treatment diary below.       Assessment: Tolerated treatment well.  Good tolerance to progression of strengthening and balance activity with minimal LE muscle fatigue reported.  Verbal/tactile cues provided with exercise for proper technique.  Decrease CG assist required with balance activity.  Patient would benefit from continued PT      Plan: Continue per plan of care.      Precautions: fall risk        Manuals 9/3 9/5 9/9 9/11 9/16 9/19                                                           Neuro Re-Ed             PPT 10:x10 10\" x10 GH          HR/TR 20x ea GH HEP          Side step  20 ft x4 RTB 20 ft x4 GTB 4x GH GTB x4       Tandem amb  20 ft x4 GH 4x GH GH       Bridge  5\"x10 10\"x 10 JL GH GH       LTR  10\"x 10 each GH JL GH        Clamshell    BTB 2x10 GH BTB 10\"x 20       Biodex - PS      L12-10 30\"x5  L11-9 30\"x5       Ther Ex             Bike  nv 5'  6' 7' L1 7' L1       TB Standing Row, Ext  RTB 5\"x 10 each GTB 5\"x10 each BTB 5:x15 BTB x20 each BTB 10\" 2x10 ext       Leg Press DL  60# 2x10 80# 2x10 80# 3x10 95# 2x10 95# 3x10                                                                        Ther Activity                                       Gait Training                                       Modalities                                                   "

## 2024-09-24 ENCOUNTER — OFFICE VISIT (OUTPATIENT)
Dept: PHYSICAL THERAPY | Facility: CLINIC | Age: 89
End: 2024-09-24
Payer: COMMERCIAL

## 2024-09-24 DIAGNOSIS — M54.50 LOW BACK PAIN WITHOUT SCIATICA, UNSPECIFIED BACK PAIN LATERALITY, UNSPECIFIED CHRONICITY: Primary | ICD-10-CM

## 2024-09-24 PROCEDURE — 97112 NEUROMUSCULAR REEDUCATION: CPT | Performed by: PHYSICAL THERAPIST

## 2024-09-24 PROCEDURE — 97530 THERAPEUTIC ACTIVITIES: CPT | Performed by: PHYSICAL THERAPIST

## 2024-09-24 PROCEDURE — 97110 THERAPEUTIC EXERCISES: CPT | Performed by: PHYSICAL THERAPIST

## 2024-09-24 NOTE — PROGRESS NOTES
"Daily Note     Today's date: 2024  Patient name: Denton Manuel  : 1935  MRN: [id number]  Referring provider: Kayleigh Jovel DO  Dx:   Encounter Diagnosis     ICD-10-CM    1. Low back pain without sciatica, unspecified back pain laterality, unspecified chronicity  M54.50                      Subjective: Denton continues to see improvement in balance and back pain      Objective: See treatment diary below      Assessment: Tolerated treatment well. Patient would benefit from continued PT      Plan: Continue per plan of care.      Precautions: fall risk        Manuals 9/3 9/5 9/9 9/11 9/16 9/19 9/24                                                          Neuro Re-Ed             PPT 10:x10 10\" x10 GH          HR/TR 20x ea GH HEP          Side step  20 ft x4 RTB 20 ft x4 GTB 4x GH GTB x4 BTB 3x      Tandem amb  20 ft x4 GH 4x GH GH JL      Bridge  5\"x10 10\"x 10 JL GH GH JL      LTR  10\"x 10 each GH JL GH        Clamshell    BTB 2x10 GH BTB 10\"x 20 JL      Biodex - PS      L12-10 30\"x5  L11-9 30\"x5 JL      Ther Ex             Bike  nv 5'  6' 7' L1 7' L1 L2 10'      TB Standing Row, Ext  RTB 5\"x 10 each GTB 5\"x10 each BTB 5:x15 BTB x20 each BTB 10\" 2x10 ext Black 10: 2x10      Leg Press DL  60# 2x10 80# 2x10 80# 3x10 95# 2x10 95# 3x10 110# 2x10                                                                       Ther Activity                                       Gait Training                                       Modalities                                                     "

## 2024-09-27 ENCOUNTER — OFFICE VISIT (OUTPATIENT)
Dept: PHYSICAL THERAPY | Facility: CLINIC | Age: 89
End: 2024-09-27
Payer: COMMERCIAL

## 2024-09-27 DIAGNOSIS — M54.50 LOW BACK PAIN WITHOUT SCIATICA, UNSPECIFIED BACK PAIN LATERALITY, UNSPECIFIED CHRONICITY: Primary | ICD-10-CM

## 2024-09-27 PROCEDURE — 97112 NEUROMUSCULAR REEDUCATION: CPT | Performed by: PHYSICAL THERAPIST

## 2024-09-27 PROCEDURE — 97110 THERAPEUTIC EXERCISES: CPT | Performed by: PHYSICAL THERAPIST

## 2024-09-27 NOTE — PROGRESS NOTES
"Daily Note     Today's date: 2024  Patient name: Denton Manuel  : 1935  MRN: [id number]  Referring provider: Kayleigh Jovel DO  Dx:   Encounter Diagnosis     ICD-10-CM    1. Low back pain without sciatica, unspecified back pain laterality, unspecified chronicity  M54.50                      Subjective: Continues to progress well      Objective: See treatment diary below      Assessment: Tolerated treatment well. Patient would benefit from continued PT      Plan: Continue per plan of care.      Precautions: fall risk        Manuals 9/3 9/5 9/9 9/11 9/16 9/19 9/27                                                          Neuro Re-Ed             PPT 10:x10 10\" x10 GH          HR/TR 20x ea GH HEP          Side step  20 ft x4 RTB 20 ft x4 GTB 4x GH GTB x4 BTB 3x      Tandem amb  20 ft x4 GH 4x GH GH JL      Bridge  5\"x10 10\"x 10 JL GH GH JL      LTR  10\"x 10 each GH JL GH        Clamshell    BTB 2x10 GH BTB 10\"x 20 JL      Biodex - PS      L12-10 30\"x5  L11-9 30\"x5 JL      Ther Ex             Bike  nv 5'  6' 7' L1 7' L1 L2 10'      TB Standing Row, Ext  RTB 5\"x 10 each GTB 5\"x10 each BTB 5:x15 BTB x20 each BTB 10\" 2x10 ext Black 10: 2x10      Leg Press DL  60# 2x10 80# 2x10 80# 3x10 95# 2x10 95# 3x10 110# 2x10                                                                       Ther Activity                                       Gait Training                                       Modalities                                                       "

## 2024-09-30 ENCOUNTER — OFFICE VISIT (OUTPATIENT)
Dept: PHYSICAL THERAPY | Facility: CLINIC | Age: 89
End: 2024-09-30
Payer: COMMERCIAL

## 2024-09-30 DIAGNOSIS — M54.50 LOW BACK PAIN WITHOUT SCIATICA, UNSPECIFIED BACK PAIN LATERALITY, UNSPECIFIED CHRONICITY: Primary | ICD-10-CM

## 2024-09-30 PROCEDURE — 97110 THERAPEUTIC EXERCISES: CPT | Performed by: PHYSICAL THERAPIST

## 2024-09-30 PROCEDURE — 97112 NEUROMUSCULAR REEDUCATION: CPT | Performed by: PHYSICAL THERAPIST

## 2024-09-30 NOTE — PROGRESS NOTES
"Daily Note     Today's date: 2024  Patient name: Denton Manuel  : 1935  MRN: [id number]  Referring provider: Kayleigh Jovel DO  Dx:   Encounter Diagnosis     ICD-10-CM    1. Low back pain without sciatica, unspecified back pain laterality, unspecified chronicity  M54.50                      Subjective: Continues to progress well      Objective: See treatment diary below      Assessment: Tolerated treatment well. Patient would benefit from continued PT      Plan: Continue per plan of care.      Precautions: fall risk        Manuals 9/3 9/5 9/9 9/11 9/16 9/19 9/27 9/30                                                         Neuro Re-Ed             PPT 10:x10 10\" x10 GH          HR/TR 20x ea GH HEP          Side step  20 ft x4 RTB 20 ft x4 GTB 4x GH GTB x4 BTB 3x JL     Tandem amb  20 ft x4 GH 4x GH GH JL JL     Bridge  5\"x10 10\"x 10 JL GH GH JL      LTR  10\"x 10 each GH JL GH        Clamshell    BTB 2x10 GH BTB 10\"x 20 JL      Biodex - PS      L12-10 30\"x5  L11-9 30\"x5 JL      Ther Ex             Bike  nv 5'  6' 7' L1 7' L1 L2 10' JL     TB Standing Row, Ext  RTB 5\"x 10 each GTB 5\"x10 each BTB 5:x15 BTB x20 each BTB 10\" 2x10 ext Black 10: 2x10 JL     Leg Press DL  60# 2x10 80# 2x10 80# 3x10 95# 2x10 95# 3x10 110# 2x10 JL                                                                      Ther Activity                                       Gait Training                                       Modalities                                                         "

## 2024-10-03 ENCOUNTER — OFFICE VISIT (OUTPATIENT)
Dept: PHYSICAL THERAPY | Facility: CLINIC | Age: 89
End: 2024-10-03
Payer: COMMERCIAL

## 2024-10-03 DIAGNOSIS — M54.50 LOW BACK PAIN WITHOUT SCIATICA, UNSPECIFIED BACK PAIN LATERALITY, UNSPECIFIED CHRONICITY: Primary | ICD-10-CM

## 2024-10-03 PROCEDURE — 97112 NEUROMUSCULAR REEDUCATION: CPT | Performed by: PHYSICAL THERAPIST

## 2024-10-03 PROCEDURE — 97110 THERAPEUTIC EXERCISES: CPT | Performed by: PHYSICAL THERAPIST

## 2024-10-03 NOTE — PROGRESS NOTES
"Daily Note     Today's date: 10/3/2024  Patient name: Denton Manuel  : 1935  MRN: [id number]  Referring provider: Kayleigh Jovel DO  Dx:   Encounter Diagnosis     ICD-10-CM    1. Low back pain without sciatica, unspecified back pain laterality, unspecified chronicity  M54.50                      Subjective: Had been feeling better with PT, but the last few days his legs have been aching      Objective: See treatment diary below      Assessment: Tolerated treatment well and modified visit with focus on self stretching . Patient would benefit from continued PT      Plan: Continue per plan of care.      Precautions: fall risk        Manuals 9/3 9/5 9/9 9/11 9/16 9/19 9/27 9/30 10/3                                                        Neuro Re-Ed             PPT 10:x10 10\" x10 GH          HR/TR 20x ea GH HEP          Side step  20 ft x4 RTB 20 ft x4 GTB 4x GH GTB x4 BTB 3x JL     Tandem amb  20 ft x4 GH 4x GH GH JL JL     Bridge  5\"x10 10\"x 10 JL GH GH JL  JL    LTR  10\"x 10 each GH JL GH    JL    Clamshell    BTB 2x10 GH BTB 10\"x 20 JL      Biodex - PS      L12-10 30\"x5  L11-9 30\"x5 JL      Ther Ex             Bike  nv 5'  6' 7' L1 7' L1 L2 10' JL 8'    TB Standing Row, Ext  RTB 5\"x 10 each GTB 5\"x10 each BTB 5:x15 BTB x20 each BTB 10\" 2x10 ext Black 10: 2x10 JL     Leg Press DL  60# 2x10 80# 2x10 80# 3x10 95# 2x10 95# 3x10 110# 2x10 JL     Supine HS         10:x10    Supine hip flex stretch EOT         1'x2 ea                                           Ther Activity                                       Gait Training                                       Modalities                                                           "

## 2024-10-08 ENCOUNTER — OFFICE VISIT (OUTPATIENT)
Dept: PHYSICAL THERAPY | Facility: CLINIC | Age: 89
End: 2024-10-08
Payer: COMMERCIAL

## 2024-10-08 DIAGNOSIS — M54.50 LOW BACK PAIN WITHOUT SCIATICA, UNSPECIFIED BACK PAIN LATERALITY, UNSPECIFIED CHRONICITY: Primary | ICD-10-CM

## 2024-10-08 PROCEDURE — 97112 NEUROMUSCULAR REEDUCATION: CPT

## 2024-10-08 PROCEDURE — 97110 THERAPEUTIC EXERCISES: CPT

## 2024-10-08 NOTE — PROGRESS NOTES
"Daily Note     Today's date: 10/8/2024  Patient name: Denton Manuel  : 1935  MRN: [id number]  Referring provider: Kayleigh Jovel DO  Dx:   Encounter Diagnosis     ICD-10-CM    1. Low back pain without sciatica, unspecified back pain laterality, unspecified chronicity  M54.50                      Subjective:  Pt continues to c/o increase soreness/weakness in b/l LE's and feeling more unsteady with his walking.   Pt's daughter states he is scheduled for virtual visit with family physician today.        Objective: See treatment diary below      Assessment: Tolerated treatment well and continue to modify with focus on self stretching and supine exercise today. Verbal/tactile cues provided with exercise for proper technique.  Mild lightheadedness reported initially with supine to sit transfer.   /72; HR 60.   Pt reported decrease LE discomfort post treatment.  Patient would benefit from continued PT.        Plan: Continue per plan of care.        Precautions: fall risk        Manuals 9/3 9/5 9/9 9/11 9/16 9/19 9/27 9/30 10/3 10/8                                                       Neuro Re-Ed             PPT 10:x10 10\" x10 GH       5\"x10   HR/TR 20x ea GH HEP          Side step  20 ft x4 RTB 20 ft x4 GTB 4x GH GTB x4 BTB 3x JL     Tandem amb  20 ft x4 GH 4x GH GH JL JL     Bridge  5\"x10 10\"x 10 JL GH GH JL  JL 5\"x10   LTR  10\"x 10 each GH JL GH    JL 10\"x 10 each   Clamshell    BTB 2x10 GH BTB 10\"x 20 JL   BTB 10\"x  10   Biodex - PS      L12-10 30\"x5  L11-9 30\"x5 JL      Ther Ex             Bike  nv 5'  6' 7' L1 7' L1 L2 10' JL 8' Held today   TB Standing Row, Ext  RTB 5\"x 10 each GTB 5\"x10 each BTB 5:x15 BTB x20 each BTB 10\" 2x10 ext Black 10: 2x10 JL     Leg Press DL  60# 2x10 80# 2x10 80# 3x10 95# 2x10 95# 3x10 110# 2x10 JL     Supine HS         10:x10 10\"x5 each   Supine hip flex stretch EOT         1'x2 ea GH   Supine piriformis stretch          10\"x5 each with tc's                           "   Ther Activity                                       Gait Training                                       Modalities

## 2024-10-10 ENCOUNTER — APPOINTMENT (OUTPATIENT)
Dept: PHYSICAL THERAPY | Facility: CLINIC | Age: 89
End: 2024-10-10
Payer: COMMERCIAL

## 2024-10-11 ENCOUNTER — APPOINTMENT (OUTPATIENT)
Dept: LAB | Facility: CLINIC | Age: 89
End: 2024-10-11
Payer: COMMERCIAL

## 2024-10-11 DIAGNOSIS — I25.10 ATHEROSCLEROSIS OF NATIVE CORONARY ARTERY WITHOUT ANGINA PECTORIS, UNSPECIFIED WHETHER NATIVE OR TRANSPLANTED HEART: ICD-10-CM

## 2024-10-11 DIAGNOSIS — I10 ESSENTIAL HYPERTENSION, MALIGNANT: ICD-10-CM

## 2024-10-11 DIAGNOSIS — N39.0 URINARY TRACT INFECTION WITHOUT HEMATURIA, SITE UNSPECIFIED: ICD-10-CM

## 2024-10-11 PROCEDURE — 36415 COLL VENOUS BLD VENIPUNCTURE: CPT

## 2024-10-11 PROCEDURE — 80053 COMPREHEN METABOLIC PANEL: CPT

## 2024-10-11 PROCEDURE — 85025 COMPLETE CBC W/AUTO DIFF WBC: CPT

## 2024-10-12 LAB
ALBUMIN SERPL BCG-MCNC: 3.8 G/DL (ref 3.5–5)
ALP SERPL-CCNC: 66 U/L (ref 34–104)
ALT SERPL W P-5'-P-CCNC: 8 U/L (ref 7–52)
ANION GAP SERPL CALCULATED.3IONS-SCNC: 12 MMOL/L (ref 4–13)
AST SERPL W P-5'-P-CCNC: 18 U/L (ref 13–39)
BASOPHILS # BLD AUTO: 0.02 THOUSANDS/ΜL (ref 0–0.1)
BASOPHILS NFR BLD AUTO: 0 % (ref 0–1)
BILIRUB SERPL-MCNC: 0.42 MG/DL (ref 0.2–1)
BUN SERPL-MCNC: 33 MG/DL (ref 5–25)
CALCIUM SERPL-MCNC: 9.6 MG/DL (ref 8.4–10.2)
CHLORIDE SERPL-SCNC: 103 MMOL/L (ref 96–108)
CO2 SERPL-SCNC: 22 MMOL/L (ref 21–32)
CREAT SERPL-MCNC: 1.19 MG/DL (ref 0.6–1.3)
EOSINOPHIL # BLD AUTO: 0.19 THOUSAND/ΜL (ref 0–0.61)
EOSINOPHIL NFR BLD AUTO: 2 % (ref 0–6)
ERYTHROCYTE [DISTWIDTH] IN BLOOD BY AUTOMATED COUNT: 15.9 % (ref 11.6–15.1)
GFR SERPL CREATININE-BSD FRML MDRD: 53 ML/MIN/1.73SQ M
GLUCOSE P FAST SERPL-MCNC: 104 MG/DL (ref 65–99)
HCT VFR BLD AUTO: 21.9 % (ref 36.5–49.3)
HGB BLD-MCNC: 7.1 G/DL (ref 12–17)
IMM GRANULOCYTES # BLD AUTO: 0.05 THOUSAND/UL (ref 0–0.2)
IMM GRANULOCYTES NFR BLD AUTO: 1 % (ref 0–2)
LYMPHOCYTES # BLD AUTO: 2.65 THOUSANDS/ΜL (ref 0.6–4.47)
LYMPHOCYTES NFR BLD AUTO: 28 % (ref 14–44)
MCH RBC QN AUTO: 36.8 PG (ref 26.8–34.3)
MCHC RBC AUTO-ENTMCNC: 32.4 G/DL (ref 31.4–37.4)
MCV RBC AUTO: 114 FL (ref 82–98)
MONOCYTES # BLD AUTO: 0.49 THOUSAND/ΜL (ref 0.17–1.22)
MONOCYTES NFR BLD AUTO: 5 % (ref 4–12)
NEUTROPHILS # BLD AUTO: 6.07 THOUSANDS/ΜL (ref 1.85–7.62)
NEUTS SEG NFR BLD AUTO: 64 % (ref 43–75)
NRBC BLD AUTO-RTO: 0 /100 WBCS
PLATELET # BLD AUTO: 466 THOUSANDS/UL (ref 149–390)
PMV BLD AUTO: 10.1 FL (ref 8.9–12.7)
POTASSIUM SERPL-SCNC: 3.4 MMOL/L (ref 3.5–5.3)
PROT SERPL-MCNC: 6.6 G/DL (ref 6.4–8.4)
RBC # BLD AUTO: 1.93 MILLION/UL (ref 3.88–5.62)
SODIUM SERPL-SCNC: 137 MMOL/L (ref 135–147)
WBC # BLD AUTO: 9.47 THOUSAND/UL (ref 4.31–10.16)

## 2024-10-14 ENCOUNTER — APPOINTMENT (OUTPATIENT)
Dept: PHYSICAL THERAPY | Facility: CLINIC | Age: 89
End: 2024-10-14
Payer: COMMERCIAL

## 2024-10-17 ENCOUNTER — APPOINTMENT (OUTPATIENT)
Dept: PHYSICAL THERAPY | Facility: CLINIC | Age: 89
End: 2024-10-17
Payer: COMMERCIAL

## 2024-10-21 ENCOUNTER — APPOINTMENT (OUTPATIENT)
Dept: PHYSICAL THERAPY | Facility: CLINIC | Age: 89
End: 2024-10-21
Payer: COMMERCIAL

## 2024-10-24 ENCOUNTER — APPOINTMENT (OUTPATIENT)
Dept: PHYSICAL THERAPY | Facility: CLINIC | Age: 89
End: 2024-10-24
Payer: COMMERCIAL

## 2024-10-28 ENCOUNTER — APPOINTMENT (OUTPATIENT)
Dept: PHYSICAL THERAPY | Facility: CLINIC | Age: 89
End: 2024-10-28
Payer: COMMERCIAL

## 2024-10-31 ENCOUNTER — APPOINTMENT (OUTPATIENT)
Dept: PHYSICAL THERAPY | Facility: CLINIC | Age: 89
End: 2024-10-31
Payer: COMMERCIAL

## 2025-03-19 ENCOUNTER — OFFICE VISIT (OUTPATIENT)
Dept: INTERNAL MEDICINE CLINIC | Facility: OTHER | Age: OVER 89
End: 2025-03-19
Payer: COMMERCIAL

## 2025-03-19 VITALS
HEIGHT: 69 IN | RESPIRATION RATE: 20 BRPM | DIASTOLIC BLOOD PRESSURE: 74 MMHG | OXYGEN SATURATION: 98 % | TEMPERATURE: 98.2 F | WEIGHT: 184.8 LBS | HEART RATE: 49 BPM | SYSTOLIC BLOOD PRESSURE: 144 MMHG | BODY MASS INDEX: 27.37 KG/M2

## 2025-03-19 DIAGNOSIS — D53.9 MACROCYTIC ANEMIA: ICD-10-CM

## 2025-03-19 DIAGNOSIS — M19.90 ARTHRITIS: ICD-10-CM

## 2025-03-19 DIAGNOSIS — M80.00XD AGE-RELATED OSTEOPOROSIS WITH CURRENT PATHOLOGICAL FRACTURE WITH ROUTINE HEALING: ICD-10-CM

## 2025-03-19 DIAGNOSIS — N18.2 STAGE 2 CHRONIC KIDNEY DISEASE: ICD-10-CM

## 2025-03-19 DIAGNOSIS — D75.839 THROMBOCYTOSIS: ICD-10-CM

## 2025-03-19 DIAGNOSIS — M48.061 LUMBAR FORAMINAL STENOSIS: ICD-10-CM

## 2025-03-19 DIAGNOSIS — R35.0 BENIGN PROSTATIC HYPERPLASIA WITH URINARY FREQUENCY: ICD-10-CM

## 2025-03-19 DIAGNOSIS — F33.0 MILD EPISODE OF RECURRENT MAJOR DEPRESSIVE DISORDER (HCC): ICD-10-CM

## 2025-03-19 DIAGNOSIS — R54 AGE-RELATED PHYSICAL DEBILITY: ICD-10-CM

## 2025-03-19 DIAGNOSIS — R73.01 IFG (IMPAIRED FASTING GLUCOSE): ICD-10-CM

## 2025-03-19 DIAGNOSIS — M47.16 OSTEOARTHRITIS OF LUMBAR SPINE WITH MYELOPATHY: ICD-10-CM

## 2025-03-19 DIAGNOSIS — R80.9 PROTEINURIA, UNSPECIFIED TYPE: ICD-10-CM

## 2025-03-19 DIAGNOSIS — N40.1 BENIGN PROSTATIC HYPERPLASIA WITH URINARY FREQUENCY: ICD-10-CM

## 2025-03-19 DIAGNOSIS — F17.290 CIGAR SMOKER: ICD-10-CM

## 2025-03-19 DIAGNOSIS — I10 PRIMARY HYPERTENSION: Primary | ICD-10-CM

## 2025-03-19 DIAGNOSIS — E78.2 MIXED HYPERLIPIDEMIA: ICD-10-CM

## 2025-03-19 PROBLEM — E78.5 HYPERLIPIDEMIA: Status: ACTIVE | Noted: 2024-10-14

## 2025-03-19 PROBLEM — N40.0 BPH (BENIGN PROSTATIC HYPERPLASIA): Status: ACTIVE | Noted: 2024-10-14

## 2025-03-19 PROBLEM — F32.A DEPRESSION: Status: ACTIVE | Noted: 2024-10-14

## 2025-03-19 PROBLEM — K28.4 BLEEDING ULCER: Status: RESOLVED | Noted: 2025-03-19 | Resolved: 2025-03-19

## 2025-03-19 PROBLEM — K28.4 BLEEDING ULCER: Status: ACTIVE | Noted: 2025-03-19

## 2025-03-19 PROBLEM — N18.31 STAGE 3A CHRONIC KIDNEY DISEASE (HCC): Status: ACTIVE | Noted: 2024-10-14

## 2025-03-19 PROCEDURE — 99205 OFFICE O/P NEW HI 60 MIN: CPT | Performed by: INTERNAL MEDICINE

## 2025-03-19 RX ORDER — TAMSULOSIN HYDROCHLORIDE 0.4 MG/1
0.4 CAPSULE ORAL
COMMUNITY

## 2025-03-19 RX ORDER — ATORVASTATIN CALCIUM 20 MG/1
20 TABLET, FILM COATED ORAL DAILY
COMMUNITY

## 2025-03-19 RX ORDER — EAR PLUGS
1000 EACH OTIC (EAR) 2 TIMES DAILY
COMMUNITY

## 2025-03-19 RX ORDER — IBUPROFEN 800 MG
400 TABLET ORAL DAILY
COMMUNITY

## 2025-03-19 RX ORDER — PANTOPRAZOLE SODIUM 40 MG/1
1 TABLET, DELAYED RELEASE ORAL
COMMUNITY
Start: 2024-10-17 | End: 2025-03-19

## 2025-03-19 RX ORDER — FAMOTIDINE 20 MG/1
20 TABLET, FILM COATED ORAL 2 TIMES DAILY
COMMUNITY

## 2025-03-19 RX ORDER — FINASTERIDE 5 MG/1
5 TABLET, FILM COATED ORAL DAILY
COMMUNITY

## 2025-03-19 RX ORDER — ALENDRONATE SODIUM 70 MG/1
70 TABLET ORAL
COMMUNITY

## 2025-03-19 RX ORDER — DULOXETIN HYDROCHLORIDE 20 MG/1
20 CAPSULE, DELAYED RELEASE ORAL DAILY
COMMUNITY

## 2025-03-19 RX ORDER — ACETAMINOPHEN 500 MG
500 TABLET ORAL
COMMUNITY

## 2025-03-19 RX ORDER — GABAPENTIN 100 MG/1
100 CAPSULE ORAL
Qty: 30 CAPSULE | Refills: 0 | Status: SHIPPED | OUTPATIENT
Start: 2025-03-19

## 2025-03-19 RX ORDER — BUPROPION HYDROCHLORIDE 100 MG/1
100 TABLET, EXTENDED RELEASE ORAL 2 TIMES DAILY
COMMUNITY

## 2025-03-19 RX ORDER — DULOXETIN HYDROCHLORIDE 30 MG/1
30 CAPSULE, DELAYED RELEASE ORAL DAILY
COMMUNITY
End: 2025-03-19

## 2025-03-19 NOTE — ASSESSMENT & PLAN NOTE
Will prescribe Voltaren gel and start gabapentin 100 mg at bedtime.  Discussed potential adverse effects.  Will refer for physical therapy.  Recommend that he use a walker for assistance with ambulation.  Orders:  •  Diclofenac Sodium (VOLTAREN) 1 %; Apply 2 g topically 4 (four) times a day as needed (joint pain)  •  Ambulatory Referral to Physical Therapy; Future  •  gabapentin (Neurontin) 100 mg capsule; Take 1 capsule (100 mg total) by mouth daily at bedtime

## 2025-03-19 NOTE — ASSESSMENT & PLAN NOTE
Continue atorvastatin 20 mg daily.  Continue to trend lipid panel.  Orders:  •  CBC; Future  •  Comprehensive metabolic panel; Future  •  Lipid panel; Future

## 2025-03-19 NOTE — ASSESSMENT & PLAN NOTE
Lab Results   Component Value Date    EGFR 73 11/21/2024    EGFR 72 10/16/2024    EGFR 65 10/15/2024    CREATININE 0.98 11/21/2024    CREATININE 1 10/16/2024    CREATININE 1.08 10/15/2024   Continue to trend kidney function.  Avoid nephrotoxic agents.

## 2025-03-19 NOTE — PROGRESS NOTES
Name: Denton Manuel      : 1935      MRN: [id number]  Encounter Provider: Danny Roper MD  Encounter Date: 3/19/2025   Encounter department: [facility]  :  Assessment & Plan  Primary hypertension  Stable, controlled.  Continue to monitor off antihypertensives.       Age-related osteoporosis with current pathological fracture with routine healing  Continue alendronate, calcium and vitamin D.       Arthritis  Will prescribe Voltaren gel and start gabapentin 100 mg at bedtime.  Discussed potential adverse effects.  Will refer for physical therapy.  Recommend that he use a walker for assistance with ambulation.  Orders:  •  Diclofenac Sodium (VOLTAREN) 1 %; Apply 2 g topically 4 (four) times a day as needed (joint pain)  •  Ambulatory Referral to Physical Therapy; Future  •  gabapentin (Neurontin) 100 mg capsule; Take 1 capsule (100 mg total) by mouth daily at bedtime    Benign prostatic hyperplasia with urinary frequency  Symptoms controlled with finasteride and Flomax.       Stage 2 chronic kidney disease  Lab Results   Component Value Date    EGFR 73 2024    EGFR 72 10/16/2024    EGFR 65 10/15/2024    CREATININE 0.98 2024    CREATININE 1 10/16/2024    CREATININE 1.08 10/15/2024   Continue to trend kidney function.  Avoid nephrotoxic agents.       Thrombocytosis         Cigar smoker         Mild episode of recurrent major depressive disorder (HCC)  Stable, controlled on current regimen.       Macrocytic anemia  Continue to trend CBC and iron panel.  Orders:  •  Iron Panel (Includes Ferritin, Iron Sat%, Iron, and TIBC); Future    Age-related physical debility  Will refer for physical therapy.       Mixed hyperlipidemia  Continue atorvastatin 20 mg daily.  Continue to trend lipid panel.  Orders:  •  CBC; Future  •  Comprehensive metabolic panel; Future  •  Lipid panel; Future    Osteoarthritis of lumbar spine with myelopathy  Will refer for spine and pain  management.  Will start gabapentin, Voltaren gel, and referral for physical therapy.  Orders:  •  Diclofenac Sodium (VOLTAREN) 1 %; Apply 2 g topically 4 (four) times a day as needed (joint pain)  •  Ambulatory Referral to Physical Therapy; Future  •  gabapentin (Neurontin) 100 mg capsule; Take 1 capsule (100 mg total) by mouth daily at bedtime  •  Ambulatory referral to Spine & Pain Management; Future    Lumbar foraminal stenosis    Orders:  •  Diclofenac Sodium (VOLTAREN) 1 %; Apply 2 g topically 4 (four) times a day as needed (joint pain)  •  Ambulatory Referral to Physical Therapy; Future  •  gabapentin (Neurontin) 100 mg capsule; Take 1 capsule (100 mg total) by mouth daily at bedtime  •  Ambulatory referral to Spine & Pain Management; Future    IFG (impaired fasting glucose)  Continue to trend A1c.  Orders:  •  Hemoglobin A1C; Future    Proteinuria, unspecified type    Orders:  •  Urinalysis with microscopic; Future           History of Present Illness   Denton Manuel is seen today for follow up of chronic conditions.   Recent laboratory studies reviewed today with the patient.   he has been compliant with his medication regimen.   He is concern for instability and falls.  He denies any recent falls.  He does not use a cane for assistance with ambulation on account of preference.  He has chronic low back pain, chronic bilateral hip pain, and chronic bilateral knee pain.  He feels that his back pain is primary concern for debility.  He was partaking in physical therapy and was doing well.  Osteoporosis: He has been compliant with alendronate, calcium, and vitamin D.    he has no complaints or concerns at this time.       Hyperlipidemia  This is a chronic problem. The current episode started more than 1 year ago. The problem is controlled. Recent lipid tests were reviewed and are normal. Pertinent negatives include no chest pain or shortness of breath. Current antihyperlipidemic treatment includes statins. The  "current treatment provides moderate improvement of lipids. There are no compliance problems.    Back Pain  This is a chronic problem. The problem occurs constantly. The pain is present in the lumbar spine. The pain does not radiate. The symptoms are aggravated by bending, position, twisting and standing. Pertinent negatives include no abdominal pain, chest pain, dysuria or fever. He has tried nothing for the symptoms.   Arthritis  Presents for initial visit. The disease course has been worsening. He complains of pain and stiffness. Affected locations include the right hip, left hip, left knee and right knee. Pertinent negatives include no diarrhea, dysuria, fatigue or fever.     Review of Systems   Constitutional: Negative.  Negative for chills, fatigue and fever.   HENT:  Negative for congestion, ear pain, postnasal drip, rhinorrhea and sore throat.    Eyes: Negative.    Respiratory:  Negative for cough, chest tightness, shortness of breath and wheezing.    Cardiovascular:  Negative for chest pain and palpitations.   Gastrointestinal:  Negative for abdominal distention, abdominal pain, blood in stool, constipation, diarrhea and nausea.   Endocrine: Negative.    Genitourinary:  Negative for difficulty urinating, dysuria and hematuria.   Musculoskeletal:  Positive for arthralgias, arthritis, back pain and stiffness.   Skin: Negative.    Allergic/Immunologic: Negative for environmental allergies and food allergies.   Neurological: Negative.    Hematological:  Negative for adenopathy.   Psychiatric/Behavioral:  Negative for agitation, behavioral problems, confusion and sleep disturbance.    All other systems reviewed and are negative.      Objective   /74 (BP Location: Left arm, Patient Position: Sitting, Cuff Size: Standard)   Pulse (!) 49   Temp 98.2 °F (36.8 °C) (Temporal)   Resp 20   Ht 5' 9\" (1.753 m)   Wt 83.8 kg (184 lb 12.8 oz)   SpO2 98%   BMI 27.29 kg/m²      Physical Exam  Vitals and nursing " note reviewed.   Constitutional:       General: He is not in acute distress.     Appearance: He is well-developed. He is not diaphoretic.   HENT:      Head: Normocephalic and atraumatic.   Eyes:      General: No scleral icterus.        Right eye: No discharge.         Left eye: No discharge.      Conjunctiva/sclera: Conjunctivae normal.      Pupils: Pupils are equal, round, and reactive to light.   Neck:      Thyroid: No thyromegaly.      Vascular: No JVD.   Cardiovascular:      Rate and Rhythm: Normal rate and regular rhythm.      Heart sounds: Normal heart sounds. No murmur heard.     No friction rub. No gallop.   Pulmonary:      Effort: Pulmonary effort is normal. No respiratory distress.      Breath sounds: Normal breath sounds. No wheezing or rales.   Chest:      Chest wall: No tenderness.   Abdominal:      General: Bowel sounds are normal. There is no distension.      Palpations: Abdomen is soft. There is no mass.      Tenderness: There is no abdominal tenderness. There is no guarding or rebound.   Musculoskeletal:         General: No tenderness or deformity.      Cervical back: Normal range of motion and neck supple.      Lumbar back: Decreased range of motion.   Lymphadenopathy:      Cervical: No cervical adenopathy.   Skin:     General: Skin is warm and dry.      Coloration: Skin is not pale.      Findings: No erythema or rash.   Neurological:      Mental Status: He is alert and oriented to person, place, and time.      Cranial Nerves: No cranial nerve deficit.      Coordination: Coordination normal.      Deep Tendon Reflexes: Reflexes are normal and symmetric.   Psychiatric:         Behavior: Behavior normal.         Thought Content: Thought content normal.         Judgment: Judgment normal.

## 2025-03-19 NOTE — ASSESSMENT & PLAN NOTE
Continue to trend CBC and iron panel.  Orders:  •  Iron Panel (Includes Ferritin, Iron Sat%, Iron, and TIBC); Future

## 2025-03-20 ENCOUNTER — TELEPHONE (OUTPATIENT)
Age: OVER 89
End: 2025-03-20

## 2025-03-20 NOTE — TELEPHONE ENCOUNTER
PA for Gabapentin (Neurontin) 100 mg capsule SUBMITTED to [facility]    via    [x]CMM-KEY: [id number]  []Surescripts-Case ID #   []Availity-Auth ID #   []Faxed to plan   []Other website   []Phone call Case ID #     [x]PA sent as URGENT    All office notes, labs and other pertaining documents and studies sent. Clinical questions answered. Awaiting determination from insurance company.     Turnaround time for your insurance to make a decision on your Prior Authorization can take 7-21 business days.

## 2025-03-21 NOTE — TELEPHONE ENCOUNTER
PA for Gabapentin (Neurontin) 100 mg capsule APPROVED     Date(s) approved until 3/19/26    Case #: : [id number]    Patient advised by          []MyChart Message  [x]Phone call   []LMOM  []L/M to call office as no active Communication consent on file  [x]Unable to leave detailed message as VM not approved on Communication consent       Pharmacy advised by    [x]Fax  []Phone call  []Secure Chat    Approval letter scanned into Media Yes

## 2025-04-09 ENCOUNTER — EVALUATION (OUTPATIENT)
Dept: PHYSICAL THERAPY | Facility: CLINIC | Age: OVER 89
End: 2025-04-09
Payer: COMMERCIAL

## 2025-04-09 DIAGNOSIS — M48.061 LUMBAR FORAMINAL STENOSIS: ICD-10-CM

## 2025-04-09 DIAGNOSIS — M47.16 OSTEOARTHRITIS OF LUMBAR SPINE WITH MYELOPATHY: Primary | ICD-10-CM

## 2025-04-09 DIAGNOSIS — M19.90 ARTHRITIS: ICD-10-CM

## 2025-04-09 PROCEDURE — 97110 THERAPEUTIC EXERCISES: CPT | Performed by: PHYSICAL THERAPIST

## 2025-04-09 PROCEDURE — 97162 PT EVAL MOD COMPLEX 30 MIN: CPT | Performed by: PHYSICAL THERAPIST

## 2025-04-09 PROCEDURE — 97112 NEUROMUSCULAR REEDUCATION: CPT | Performed by: PHYSICAL THERAPIST

## 2025-04-09 NOTE — PROGRESS NOTES
PT Evaluation     Today's date: 2025  Patient name: Denton Manuel  : 1935  MRN: [id number]  Referring provider: Danny Roper MD  Dx:   Encounter Diagnosis     ICD-10-CM    1. Osteoarthritis of lumbar spine with myelopathy  M47.16 Ambulatory Referral to Physical Therapy      2. Arthritis  M19.90 Ambulatory Referral to Physical Therapy      3. Lumbar foraminal stenosis  M48.061 Ambulatory Referral to Physical Therapy                     Assessment  Impairments: activity intolerance, impaired balance and endurance    Assessment details: Denton Manuel is a 89 y.o. year old male presenting to PT with pain, decreased range of motion, decreased strength, and decreased tolerance to activity. Signs and symptoms are consistent with referring diagnosis of OA, Lumbar Stenosis and balance dysfunction.  The existing impairments result in difficulty with his usual housework and gardening. Denton would benefit from skilled PT services to address these issues and to maximize function.  Home exercise provided and all questions answered. Thank you for the referral.      Goals  STG 2-4 weeks  Increase gait speed .5 ft/sec -   Increase BLE strength 5-10# -   Increase standing and walking tolerance to >20 minutes -   Patient to navigate 5 stairs Margie with unilateral railing -     LTG 6-8 weeks  Increase gait speed 1.0 ft/sec  Increase BLE strength 15-20#  Increase standing and walking tolerance to 30 minutes      Plan    Planned therapy interventions: neuromuscular re-education, postural training, strengthening, stretching, therapeutic activities and therapeutic exercise    Frequency: 2x week  Duration in weeks: 8      Subjective Evaluation    History of Present Illness  Mechanism of injury: Denton reports that he discontinued PT last year due to a GI bleed with low RBC count.  He was hesitant to return to PT and questions whether exercise is worth performing at his age.      He lives at home with supportive family  nearby and looks forward to gardening in the spring.     He notes decrease in strength and balance since his last PT visits. No significant pain to report today, but sometimes has pain in his hips.  Patient Goals  Patient goals for therapy: decreased pain, improved balance and increased strength    Treatments  Previous treatment: physical therapy  Current treatment: physical therapy      Objective     Strength/Myotome Testing     Left Hip   Planes of Motion   Flexion: 4  Abduction: 4-  Adduction: 4    Right Hip   Planes of Motion   Flexion: 4  Abduction: 4-  Adduction: 4    Left Knee   Flexion: 4-  Extension: 4    Right Knee   Flexion: 4-  Extension: 4    Left Ankle/Foot   Dorsiflexion: 4  Plantar flexion: 4    Right Ankle/Foot   Dorsiflexion: 4  Plantar flexion: 4    General Comments:      Lumbar Comments  Unable to perform SLS or tandem static balance tasks             Precautions: Fall risk, hx lumbar fx      Manuals 4/9                                                                Neuro Re-Ed             Side step             Walking march             TB Row, LAE             Tandem walk             Tandem balance                                       Ther Ex             Bike L2 10'            Leg press 60# 3x10            LTR             PPT/bridge             HR/TR                                                    Ther Activity                                       Gait Training                                       Modalities

## 2025-04-14 ENCOUNTER — OFFICE VISIT (OUTPATIENT)
Dept: PHYSICAL THERAPY | Facility: CLINIC | Age: OVER 89
End: 2025-04-14
Payer: COMMERCIAL

## 2025-04-14 DIAGNOSIS — M48.061 LUMBAR FORAMINAL STENOSIS: ICD-10-CM

## 2025-04-14 DIAGNOSIS — M47.16 OSTEOARTHRITIS OF LUMBAR SPINE WITH MYELOPATHY: Primary | ICD-10-CM

## 2025-04-14 PROCEDURE — 97112 NEUROMUSCULAR REEDUCATION: CPT | Performed by: PHYSICAL THERAPIST

## 2025-04-14 PROCEDURE — 97530 THERAPEUTIC ACTIVITIES: CPT | Performed by: PHYSICAL THERAPIST

## 2025-04-14 PROCEDURE — 97110 THERAPEUTIC EXERCISES: CPT | Performed by: PHYSICAL THERAPIST

## 2025-04-14 NOTE — PROGRESS NOTES
Daily Note     Today's date: 2025  Patient name: Denton Manuel  : 1935  MRN: [id number]  Referring provider: Danny Roper MD  Dx:   Encounter Diagnosis     ICD-10-CM    1. Osteoarthritis of lumbar spine with myelopathy  M47.16       2. Lumbar foraminal stenosis  M48.061                      Subjective: Increased LBP since bike and leg press last visit.       Objective: See treatment diary below      Assessment: Tolerated treatment well and modified program today . Patient would benefit from continued PT      Plan: Continue per plan of care.      Precautions: Fall risk, hx lumbar fx      Manuals                                                                Neuro Re-Ed             Side step             Walking march             TB Row, LAE             Tandem walk             Tandem balance             Supine HS  10:x10           Clamshell  GTB 2x10           Ther Ex             Bike L2 10'            Leg press 60# 3x10            LTR  10:x10           PPT/bridge  Bridge 5:x10           HR/TR                                                    Ther Activity                                       Gait Training                                       Modalities

## 2025-04-17 ENCOUNTER — OFFICE VISIT (OUTPATIENT)
Dept: PHYSICAL THERAPY | Facility: CLINIC | Age: OVER 89
End: 2025-04-17
Payer: COMMERCIAL

## 2025-04-17 DIAGNOSIS — M48.061 LUMBAR FORAMINAL STENOSIS: ICD-10-CM

## 2025-04-17 DIAGNOSIS — M47.16 OSTEOARTHRITIS OF LUMBAR SPINE WITH MYELOPATHY: Primary | ICD-10-CM

## 2025-04-17 PROCEDURE — 97110 THERAPEUTIC EXERCISES: CPT

## 2025-04-17 PROCEDURE — 97112 NEUROMUSCULAR REEDUCATION: CPT

## 2025-04-17 NOTE — PROGRESS NOTES
"Daily Note     Today's date: 2025  Patient name: Denton Manuel  : 1935  MRN: [id number]  Referring provider: Danny Roper MD  Dx:   Encounter Diagnosis     ICD-10-CM    1. Osteoarthritis of lumbar spine with myelopathy  M47.16       2. Lumbar foraminal stenosis  M48.061                      Subjective:  Pt c/o increase low back and b/l hip pain today stating he can hardly walk.  Pt states he felt ok when he got out of bed but pain increased during the morning.   Pt denies increase pain after last visit.       Objective: See treatment diary below      Assessment: Tolerated treatment well and continue to modify as needed secondary to pain.  Good response to MH in supine 90/90 and decrease pain reported with standing/walking post treatment.   Patient would benefit from continued PT      Plan: Continue per plan of care.      Precautions: Fall risk, hx lumbar fx      Manuals                                                               Neuro Re-Ed             Side step             Walking march             TB Row, LAE             Tandem walk             Tandem balance             Supine HS  10:x10 10\"x 10 each with towel          Clamshell  GTB 2x10 GTB 10\"x 10          Ther Ex             Bike L2 10'            Leg press 60# 3x10            LTR  10:x10 10\"x 10          PPT/bridge  Bridge 5:x10 PPT 10\"x 10          HR/TR             MH supine 90/90   10 min                                    Ther Activity                                       Gait Training                                       Modalities                                                 "

## 2025-04-22 ENCOUNTER — OFFICE VISIT (OUTPATIENT)
Dept: PHYSICAL THERAPY | Facility: CLINIC | Age: OVER 89
End: 2025-04-22
Payer: COMMERCIAL

## 2025-04-22 DIAGNOSIS — M19.90 ARTHRITIS: ICD-10-CM

## 2025-04-22 DIAGNOSIS — M47.16 OSTEOARTHRITIS OF LUMBAR SPINE WITH MYELOPATHY: Primary | ICD-10-CM

## 2025-04-22 DIAGNOSIS — M48.061 LUMBAR FORAMINAL STENOSIS: ICD-10-CM

## 2025-04-22 PROCEDURE — 97112 NEUROMUSCULAR REEDUCATION: CPT | Performed by: PHYSICAL THERAPIST

## 2025-04-22 PROCEDURE — 97110 THERAPEUTIC EXERCISES: CPT | Performed by: PHYSICAL THERAPIST

## 2025-04-22 PROCEDURE — 97530 THERAPEUTIC ACTIVITIES: CPT | Performed by: PHYSICAL THERAPIST

## 2025-04-22 NOTE — PROGRESS NOTES
"Daily Note     Today's date: 2025  Patient name: Denton Manuel  : 1935  MRN: [id number]  Referring provider: Danny Roper MD  Dx:   Encounter Diagnosis     ICD-10-CM    1. Osteoarthritis of lumbar spine with myelopathy  M47.16       2. Lumbar foraminal stenosis  M48.061       3. Arthritis  M19.90                      Subjective: Denton typically feels better after PT, but today woke up with increased LBP.       Objective: See treatment diary below      Assessment: Tolerated treatment well and updated HEP to focus on the exercises he has been performing in PT to help manage back pain between visits . Patient would benefit from continued PT      Plan: Continue per plan of care.  Progress treatment as tolerated.       Precautions: Fall risk, hx lumbar fx      Manuals                                                              Neuro Re-Ed             Side step             Walking march             TB Row, LAE             Tandem walk             Tandem balance             Supine HS  10:x10 10\"x 10 each with towel          Clamshell  GTB 2x10 GTB 10\"x 10          Ther Ex             Bike L2 10'            Leg press 60# 3x10            LTR  10:x10 10\"x 10          PPT/bridge  Bridge 5:x10 PPT 10\"x 10          HR/TR             MH supine 90/90   10 min                                    Ther Activity                                       Gait Training                                       Modalities                                                 "

## 2025-04-25 ENCOUNTER — OFFICE VISIT (OUTPATIENT)
Dept: PHYSICAL THERAPY | Facility: CLINIC | Age: OVER 89
End: 2025-04-25
Payer: COMMERCIAL

## 2025-04-25 DIAGNOSIS — M19.90 ARTHRITIS: ICD-10-CM

## 2025-04-25 DIAGNOSIS — M47.16 OSTEOARTHRITIS OF LUMBAR SPINE WITH MYELOPATHY: Primary | ICD-10-CM

## 2025-04-25 DIAGNOSIS — M48.061 LUMBAR FORAMINAL STENOSIS: ICD-10-CM

## 2025-04-25 PROCEDURE — 97110 THERAPEUTIC EXERCISES: CPT

## 2025-04-25 PROCEDURE — 97112 NEUROMUSCULAR REEDUCATION: CPT

## 2025-04-25 PROCEDURE — 97530 THERAPEUTIC ACTIVITIES: CPT

## 2025-04-25 NOTE — PROGRESS NOTES
"Daily Note     Today's date: 2025  Patient name: Denton Manuel  : 1935  MRN: [id number]  Referring provider: Danny Roper MD  Dx:   Encounter Diagnosis     ICD-10-CM    1. Osteoarthritis of lumbar spine with myelopathy  M47.16       2. Lumbar foraminal stenosis  M48.061       3. Arthritis  M19.90                      Subjective: Denton continues to report having a lot of back pain and states his balance seems to be worse.        Objective: See treatment diary below      Assessment: Tolerated treatment well.  Added balance activity with minimal CG assist required.  Patient would benefit from continued PT      Plan: Continue per plan of care.  Progress treatment as tolerated.       Precautions: Fall risk, hx lumbar fx      Manuals                                                             Neuro Re-Ed             Side step             Walking march             TB Row, LAE             Tandem walk     20 ft x2        Tandem balance - modified     20\"x2 each        Supine HS  10:x10 10\"x 10 each with towel          Clamshell  GTB 2x10 GTB 10\"x 10  GTB 10\"x 10        Ther Ex             Bike L2 10'            Leg press 60# 3x10            LTR  10:x10 10\"x 10  10\" x10        PPT/bridge  Bridge 5:x10 PPT 10\"x 10  PPT/ low bridge 5\"x10        HR/TR             MH supine 90/90   10 min  10 min                                  Ther Activity                                       Gait Training                                       Modalities                                                 "

## 2025-04-28 ENCOUNTER — OFFICE VISIT (OUTPATIENT)
Dept: PHYSICAL THERAPY | Facility: CLINIC | Age: OVER 89
End: 2025-04-28
Payer: COMMERCIAL

## 2025-04-28 DIAGNOSIS — M19.90 ARTHRITIS: ICD-10-CM

## 2025-04-28 DIAGNOSIS — M48.061 LUMBAR FORAMINAL STENOSIS: ICD-10-CM

## 2025-04-28 DIAGNOSIS — M47.16 OSTEOARTHRITIS OF LUMBAR SPINE WITH MYELOPATHY: Primary | ICD-10-CM

## 2025-04-28 PROCEDURE — 97110 THERAPEUTIC EXERCISES: CPT

## 2025-04-28 PROCEDURE — 97530 THERAPEUTIC ACTIVITIES: CPT

## 2025-04-28 PROCEDURE — 97112 NEUROMUSCULAR REEDUCATION: CPT

## 2025-05-01 ENCOUNTER — OFFICE VISIT (OUTPATIENT)
Dept: PHYSICAL THERAPY | Facility: CLINIC | Age: OVER 89
End: 2025-05-01
Payer: COMMERCIAL

## 2025-05-01 DIAGNOSIS — M47.16 OSTEOARTHRITIS OF LUMBAR SPINE WITH MYELOPATHY: Primary | ICD-10-CM

## 2025-05-01 DIAGNOSIS — M48.061 LUMBAR FORAMINAL STENOSIS: ICD-10-CM

## 2025-05-01 PROCEDURE — 97112 NEUROMUSCULAR REEDUCATION: CPT | Performed by: PHYSICAL THERAPIST

## 2025-05-01 PROCEDURE — 97530 THERAPEUTIC ACTIVITIES: CPT | Performed by: PHYSICAL THERAPIST

## 2025-05-01 PROCEDURE — 97110 THERAPEUTIC EXERCISES: CPT | Performed by: PHYSICAL THERAPIST

## 2025-05-01 NOTE — PROGRESS NOTES
"Daily Note     Today's date: 2025  Patient name: Denton Manuel  : 1935  MRN: [id number]  Referring provider: Danny Roper MD  Dx:   Encounter Diagnosis     ICD-10-CM    1. Osteoarthritis of lumbar spine with myelopathy  M47.16       2. Lumbar foraminal stenosis  M48.061                      Subjective: Back is a little better today, but balance remains a problem.       Objective: See treatment diary below      Assessment: Tolerated treatment well. Patient would benefit from continued PT      Plan: Continue per plan of care.      Precautions: Fall risk, hx lumbar fx      Manuals                                                           Neuro Re-Ed             Side step      20 ft x2       Walking march             TB Row, LAE             Tandem walk     20 ft x2 GH       Tandem balance - modified     20\"x2 each        Supine HS  10:x10 10\"x 10 each with towel   10\"x 10 with towel JL      Clamshell  GTB 2x10 GTB 10\"x 10  GTB 10\"x 10 GH JL      Ther Ex             Bike L2 10'            Leg press 60# 3x10            LTR  10:x10 10\"x 10  10\" x10 GH JL      PPT/bridge  Bridge 5:x10 PPT 10\"x 10  PPT/ low bridge 5\"x10 PPT/  low bridge 10\"x 10 JL      HR/TR             MH supine 90/90   10 min  10 min GH JL                                Ther Activity                                       Gait Training                                       Modalities                                                 " normal gait and station , no tenderness or deformities present

## 2025-05-06 ENCOUNTER — OFFICE VISIT (OUTPATIENT)
Dept: PHYSICAL THERAPY | Facility: CLINIC | Age: OVER 89
End: 2025-05-06
Payer: COMMERCIAL

## 2025-05-06 DIAGNOSIS — M48.061 LUMBAR FORAMINAL STENOSIS: ICD-10-CM

## 2025-05-06 DIAGNOSIS — M47.16 OSTEOARTHRITIS OF LUMBAR SPINE WITH MYELOPATHY: Primary | ICD-10-CM

## 2025-05-06 DIAGNOSIS — M19.90 ARTHRITIS: ICD-10-CM

## 2025-05-06 PROCEDURE — 97112 NEUROMUSCULAR REEDUCATION: CPT

## 2025-05-06 PROCEDURE — 97110 THERAPEUTIC EXERCISES: CPT

## 2025-05-06 NOTE — PROGRESS NOTES
"Daily Note     Today's date: 2025  Patient name: Denton Manuel  : 1935  MRN: [id number]  Referring provider: Danny Roper MD  Dx:   Encounter Diagnosis     ICD-10-CM    1. Osteoarthritis of lumbar spine with myelopathy  M47.16       2. Lumbar foraminal stenosis  M48.061       3. Arthritis  M19.90                      Subjective:  Pt c/o increase back pain today and frustrated with persistent pain.  Pt compliant with HEP.        Objective: See treatment diary below.  Pt seen x8 visits with FOTO outcome measure 44 at IE and 46 this visit.       Assessment: Tolerated treatment well.  Pt has good understanding of exercise and demonstrates proper technique.        Plan:  Hold on therapy at this time per pt request and minimal improvement.  Pt will continue with HEP.        Precautions: Fall risk, hx lumbar fx      Manuals                                                          Neuro Re-Ed             Side step      20 ft x2       Walking march             TB Row, LAE             Tandem walk     20 ft x2        Tandem balance - modified     20\"x2 each        Supine HS  10:x10 10\"x 10 each with towel   10\"x 10 with towel JL 10\"x 10 each     Clamshell  GTB 2x10 GTB 10\"x 10  GTB 10\"x 10 [facility]     Ther Ex             Bike L2 10'            Leg press 60# 3x10            LTR  10:x10 10\"x 10  10\" x10 GH JL 10\"x 10     PPT/bridge  Bridge 5:x10 PPT 10\"x 10  PPT/ low bridge 5\"x10 PPT/  low bridge 10\"x 10 JL 10\"x 10     HR/TR             MH supine 90/90   10 min  10 min [facility]                               Ther Activity                                       Gait Training                                       Modalities                                                 "

## 2025-05-08 NOTE — PROGRESS NOTES
[facility] CARDIOLOGY OFFICE FOLLOW UP NOTE       Date of Encounter: 05/14/25   YOB: 1949   MRN: [id number]   Primary Care Provider: Swati Orozco NP     Chief Complaint   Patient presents with    Heart Problem     CAD s/p stent, hx of RBBB, HTN, HLD        HISTORY OF PRESENT ILLNESS     Jg Salguero Jr was seen at [facility] on 05/14/25.      Past medical history is significant for  CAD s/p stenting of circumflex marginal branch in 2018 while living in [place], this was done in the setting of an acute coronary event, Hx of RBBB of unknown duration. Hospitalized 5/14/23 with CP, ECHO with EF 61%, mild hypokinesis to lateral wall, grade 1 diastolic dysfunction, mild mitral and aortic insufficiency. NM stress was abnormal with dense infarct of lateral wall, preserved LV function, similar to prior study. Other history includes HTN, HLD, DM.     Last seen in Cardiology by Leona Cho NP on 08/29/24.    TODAY'S DISCUSSION:  The patient presents today for follow-up and reports doing well overall, with no significant concerns. He remains active, frequently engaging in outdoor activities and walking 1-2 miles every other day. Over the past year, he has noticed a gradual decline in stamina, noting occasional shortness of breath with exertion--particularly [place] airport or [place]. He denies any exertional chest pain and attributes the shortness of breath to age-related changes and possible deconditioning. He denies any significant lower extremity edema (trace amount with sock line) and is able to lie flat in bed.     He follows a generally healthy diet, does not use tobacco, and consumes alcohol in moderation.    He denies experiencing chest pain, claudication, shortness of breath at rest, palpitations, dizziness, lightheadedness, presyncope, syncope, paroxysmal nocturnal dyspnea (PND), orthopnea, edema, or rapid weight gain.    We reviewed his most recent  "Daily Note     Today's date: 2025  Patient name: Denton Manuel  : 1935  MRN: [id number]  Referring provider: Danny Roper MD  Dx:   Encounter Diagnosis     ICD-10-CM    1. Osteoarthritis of lumbar spine with myelopathy  M47.16       2. Lumbar foraminal stenosis  M48.061       3. Arthritis  M19.90                      Subjective: Denton continues to report persistent back pain and states he doesn't remember when he had a pain free day.  Pt denies increase pain after progression of exercise last visit.        Objective: See treatment diary below      Assessment: Tolerated treatment well.  Progressed balance activity with minimal CG assist required.  Minimal low back pain with standing exercise. Patient would benefit from continued PT      Plan: Continue per plan of care.  Progress treatment as tolerated.       Precautions: Fall risk, hx lumbar fx      Manuals                                                            Neuro Re-Ed             Side step      20 ft x2       Walking march             TB Row, LAE             Tandem walk     20 ft x2 GH       Tandem balance - modified     20\"x2 each        Supine HS  10:x10 10\"x 10 each with towel   10\"x 10 with towel       Clamshell  GTB 2x10 GTB 10\"x 10  GTB 10\"x 10 GH       Ther Ex             Bike L2 10'            Leg press 60# 3x10            LTR  10:x10 10\"x 10  10\" x10 GH       PPT/bridge  Bridge 5:x10 PPT 10\"x 10  PPT/ low bridge 5\"x10 PPT/  low bridge 10\"x 10       HR/TR             MH supine 90/90   10 min  10 min GH                                 Ther Activity                                       Gait Training                                       Modalities                                                 " echocardiogram from 2023 and recent laboratory results. He has an upcoming appointment with his PCP 05/16/25.     I have reviewed the patient's past medical history, surgical history, family history, social history, allergies, and current medications in the electronic medical record. I verify that they are complete and accurate.       SOCIAL HISTORY     Social History     Tobacco Use   Smoking Status Never    Passive exposure: Past   Smokeless Tobacco Never          ALLERGIES     ALLERGIES:  No Known Allergies       CURRENT MEDICATIONS     Current Outpatient Medications   Medication Sig Dispense Refill    metoPROLOL tartrate (LOPRESSOR) 25 MG tablet TAKE 1/2 TABLET BY MOUTH TWICE DAILY WITH FOOD 90 tablet 1    atorvastatin (LIPITOR) 40 MG tablet TAKE 1 TABLET BY MOUTH DAILY 90 tablet 3    metformin (GLUCOPHAGE) 1000 MG tablet TAKE 1 TABLET BY MOUTH TWICE DAILY WITH MEALS 180 tablet 3    omeprazole (PrilOSEC) 20 MG capsule TAKE 1 CAPSULE BY MOUTH DAILY 90 capsule 3    ibuprofen (ADVIL YING STRENGTH) 100 MG tablet Take 200 mg by mouth every 6 hours as needed for Fever.      nitroGLYCERIN (NITROSTAT) 0.4 MG sublingual tablet Place 1 tablet under the tongue every 5 minutes as needed for Chest pain. 10 tablet 1    empagliflozin (JARDIANCE) 25 MG tablet Take 1 tablet by mouth daily (before breakfast). 90 tablet 3    naproxen sodium (Aleve) 220 MG tablet Take 220 mg by mouth 2 times daily as needed (pain/stiffness).      aspirin (ECOTRIN) 81 MG EC tablet Take 81 mg by mouth daily.       No current facility-administered medications for this visit.          REVIEW OF SYSTEMS       Review of Systems   Cardiovascular:  Positive for dyspnea on exertion (intermittent). Negative for chest pain, claudication, cyanosis, irregular heartbeat, leg swelling, near-syncope, orthopnea, palpitations, paroxysmal nocturnal dyspnea and syncope.         Physical Exam     Visit Vitals  /74 (BP Location: LUE - Left upper extremity,  Patient Position: Sitting, Cuff Size: Large Adult)   Pulse 78   Ht 6' (1.829 m)   Wt 99.3 kg (219 lb)   SpO2 97%   BMI 29.70 kg/m²       Wt Readings from Last 4 Encounters:   05/14/25 99.3 kg (219 lb)   02/25/25 102.9 kg (226 lb 11.9 oz)   11/04/24 99.4 kg (219 lb 2.2 oz)   08/29/24 101.2 kg (223 lb)       Physical Exam  Vitals reviewed.   Constitutional:       General: He is not in acute distress.     Appearance: Normal appearance.   HENT:      Head: Normocephalic and atraumatic.   Eyes:      Conjunctiva/sclera: Conjunctivae normal.   Neck:      Vascular: No carotid bruit or JVD.   Cardiovascular:      Rate and Rhythm: Normal rate and regular rhythm.      Pulses: Normal pulses.           Carotid pulses are 2+ on the right side and 2+ on the left side.       Radial pulses are 2+ on the right side and 2+ on the left side.        Posterior tibial pulses are 2+ on the right side and 2+ on the left side.      Heart sounds: Normal heart sounds. No murmur heard.     No friction rub. No gallop.   Pulmonary:      Effort: Pulmonary effort is normal.      Breath sounds: Normal breath sounds.   Musculoskeletal:      Cervical back: Neck supple.      Right lower leg: No edema.      Left lower leg: No edema.   Skin:     General: Skin is warm and dry.   Neurological:      General: No focal deficit present.      Mental Status: He is alert and oriented to person, place, and time.   Psychiatric:         Mood and Affect: Mood normal.         Behavior: Behavior normal.            LABS      Lab Results   Component Value Date    POTASSIUM 5.0 05/09/2025    SODIUM 143 05/09/2025    CO2 29 05/09/2025    CHLORIDE 104 05/09/2025    BUN 19 05/09/2025    CREATININE 0.82 05/09/2025    HGBA1C 6.4 (H) 05/09/2025    GLUCOSE 112 (H) 05/09/2025    WBC 6.2 05/09/2025    RBC 4.54 05/09/2025    HCT 45.9 05/09/2025    HGB 14.9 05/09/2025     05/09/2025    CHOLESTEROL 151 05/09/2025    HDL 73 05/09/2025    CHOHDL 2.1 05/09/2025    TRIGLYCERIDE 70  05/09/2025    CALCLDL 64 05/09/2025    INR 1.0 05/14/2023       Last lipid assessment:    Cholesterol (mg/dL)   Date Value   05/09/2025 151     HDL (mg/dL)   Date Value   05/09/2025 73     Cholesterol/ HDL Ratio (no units)   Date Value   05/09/2025 2.1     Triglycerides (mg/dL)   Date Value   05/09/2025 70     LDL (mg/dL)   Date Value   05/09/2025 64          IMAGING     ECHOCARDIOGRAM: 05/15/23  Abnormal LV Global longitudinal strain -14.0 %.  Normal left ventricular chamber size.  Left ventricular ejection fraction; 61 %.  Left ventricular regional wall motion abnormalities (see diagram).  Mild increased left ventricular hypertrophy, most prominent in the basal septum without obstruction.  Grade I diastolic dysfunction of the left ventricle (impaired relaxation pattern).  Mildly increased left ventricular diastolic filling pressure.  Normal right ventricular chamber size.  Mildly decreased right ventricular systolic function.  Aortic valve sclerosis without stenosis.  Trivial-mild aortic valve regurgitation.  Mildly thickened mitral valve.  Mild mitral annular calcification.  Mild mitral valve regurgitation.  Right ventricular systolic pressure; 34 mmHg.  Normal size aortic root and proximal ascending aorta when corrected for BSA.  Normal IVC dimension with <50% respiratory change of the inferior vena cava.  Definity contrast administered to better visualize endocardial definition.      ECG INTERPRETATION   Results for orders placed or performed during the hospital encounter of 05/14/23   Electrocardiogram 12-Lead   Result Value Ref Range    Ventricular Rate EKG/Min (BPM) 86     Atrial Rate (BPM) 86     HI-Interval (MSEC) 158     QRS-Interval (MSEC) 134     QT-Interval (MSEC) 410     QTc 490     P Axis (Degrees) 48     R Axis (Degrees) -158     T Axis (Degrees) 43     REPORT TEXT       Sinus rhythm  with frequent  premature ventricular complexes  Right bundle branch block  Anterior infarct  , age  undetermined  Abnormal ECG  When compared with ECG of  14-MAY-2023 14:19,  premature ventricular complexes  are now  present  Anterior infarct  is now  present  Agree  Confirmed by DAYSI MARLEY M.D. ([id number]),  Zaina Meyer ([id number]) on 5/31/2023 4:59:23 PM           ASSESSMENT AND PLAN     75 year old male with:    1. Coronary artery disease involving native coronary artery of native heart without angina pectoris (Primary)  2. Stented coronary artery circumflex 09/24/2018 [employer] 2.25 mm by 12 mm  Stable, no chest pain reported intermittent dyspnea could be related to diastolic dysfunction or deconditioning, unlikely angina. Could consider stress test in the future if symptoms progress.   - Most recent EKG from 05/14/25 reviewed, unchanged  - Continue therapy:   Antiplatelet: ASA 81mg daily    BB: metoprolol tartrate 12.5mg BID   Statin: atorvastatin 40mg daily    Antianginal: NTG .4mg PRN (has not used in the past 6 months)    3. Primary hypertension  -blood pressure well controlled today with in office reading of 128/74  -continue current antihypertensive regimen   -Goal <130/80     4. Mixed hyperlipidemia  -last LDL was 61 on 10/28/24  -no noted myalgia, continue on statin therapy with atorvastatin 40mg daily  -encourage low fat heart healthy diet and exercise       5. Dyspnea  Jg reports gradual, intermittent dyspnea with exertion. He remains able to continue walking and exercising, though he notes that symptoms may have begun within the past year, particularly with activities such as hurried walking and stair climbing. A repeat echocardiogram is planned to reassess left ventricular function, diastolic function, as well as to evaluate for any valvular abnormalities.    Dr. Parker was in clinic at time of office visit today.     Follow up in 6 months or sooner if necessary.    Reviewed signs/symptoms to monitor and report.  Encouraged to call with questions or concerns.      Parul  Dick, FNP-C    [facility]  [address] [place] [address]  Tel [phone]

## 2025-05-09 ENCOUNTER — APPOINTMENT (OUTPATIENT)
Dept: PHYSICAL THERAPY | Facility: CLINIC | Age: OVER 89
End: 2025-05-09
Attending: INTERNAL MEDICINE
Payer: COMMERCIAL

## 2025-05-19 DIAGNOSIS — N40.1 BENIGN PROSTATIC HYPERPLASIA WITH LOWER URINARY TRACT SYMPTOMS, SYMPTOM DETAILS UNSPECIFIED: Primary | ICD-10-CM

## 2025-05-19 NOTE — TELEPHONE ENCOUNTER
Medication: tamsulosin (Flomax) 0.4 mg     Dose/Frequency:     Take 0.4 mg by mouth daily with dinner       Quantity: 90    Pharmacy: [facility]    Office:   [x] PCP/Provider - pt just switched to Dr Roper as PCP, old PCP last filled the medication  [] Speciality/Provider -     Does the patient have enough for 3 days?   [] Yes   [x] No - Send as HP to POD - completely out

## 2025-05-20 RX ORDER — TAMSULOSIN HYDROCHLORIDE 0.4 MG/1
0.4 CAPSULE ORAL
Qty: 100 CAPSULE | Refills: 1 | Status: SHIPPED | OUTPATIENT
Start: 2025-05-20

## 2025-05-28 LAB
ALBUMIN SERPL-MCNC: 4.1 G/DL (ref 3.5–5.7)
ALP SERPL-CCNC: 84 U/L (ref 35–120)
ALT SERPL-CCNC: 14 U/L
AMORPH URATE CRY URNS QL MICRO: PRESENT
ANION GAP SERPL CALCULATED.3IONS-SCNC: 9 MMOL/L (ref 3–11)
AST SERPL-CCNC: 29 U/L
BILIRUB SERPL-MCNC: 0.7 MG/DL (ref 0.2–1)
BUN SERPL-MCNC: 15 MG/DL (ref 7–28)
CALCIUM SERPL-MCNC: 9.4 MG/DL (ref 8.5–10.5)
CHLORIDE SERPL-SCNC: 101 MMOL/L (ref 100–109)
CHOLEST SERPL-MCNC: 292 MG/DL
CHOLEST/HDLC SERPL: 5.5 {RATIO}
CO2 SERPL-SCNC: 28 MMOL/L (ref 21–31)
CREAT SERPL-MCNC: 0.99 MG/DL (ref 0.53–1.3)
CYTOLOGY CMNT CVX/VAG CYTO-IMP: NORMAL
ERYTHROCYTE [DISTWIDTH] IN BLOOD BY AUTOMATED COUNT: 14.4 % (ref 12–16)
EST. AVERAGE GLUCOSE BLD GHB EST-MCNC: 117 MG/DL
FERRITIN SERPL-MCNC: 531 NG/ML (ref 23.9–336.2)
GFR/BSA.PRED SERPLBLD CYS-BASED-ARV: 72 ML/MIN/{1.73_M2}
GLUCOSE SERPL-MCNC: 93 MG/DL (ref 65–99)
GLUCOSE UR QL STRIP: NEGATIVE MG/DL
HBA1C MFR BLD: 5.7 %
HCT VFR BLD AUTO: 40.1 % (ref 37–48)
HDLC SERPL-MCNC: 53 MG/DL (ref 23–92)
HGB BLD-MCNC: 13.6 G/DL (ref 12.5–17)
HGB UR QL STRIP: NEGATIVE MG/DL
IRON SATN MFR SERPL: 26 % (ref 20–50)
IRON SERPL-MCNC: 88 UG/DL (ref 50–212)
KETONES UR QL STRIP: NEGATIVE MG/DL
LDLC SERPL CALC-MCNC: 193 MG/DL
LEUKOCYTE ESTERASE UR QL STRIP: ABNORMAL /UL
MCH RBC QN AUTO: 35.4 PG (ref 27–36)
MCHC RBC AUTO-ENTMCNC: 33.9 G/DL (ref 32–37)
MCV RBC AUTO: 104 FL (ref 80–100)
MUCOUS THREADS URNS QL MICRO: ABNORMAL
NITRITE UR QL STRIP: NEGATIVE
NONHDLC SERPL-MCNC: 239 MG/DL
PH UR: 6.5 [PH] (ref 4.5–8)
PLATELET # BLD AUTO: 349 THOU/CMM (ref 140–350)
PMV BLD REES-ECKER: 7.8 FL (ref 7.5–11.3)
POTASSIUM SERPL-SCNC: 4.3 MMOL/L (ref 3.5–5.2)
PROT 24H UR-MRATE: NEGATIVE MG/DL
PROT SERPL-MCNC: 7.2 G/DL (ref 6.3–8.3)
RBC # BLD AUTO: 3.84 MILL/CMM (ref 4–5.4)
RBC #/AREA URNS HPF: ABNORMAL /HPF (ref 0–2)
SL AMB POCT URINE COMMENT: ABNORMAL
SODIUM SERPL-SCNC: 138 MMOL/L (ref 135–145)
SP GR UR: 1.02 (ref 1–1.03)
SQUAMOUS #/AREA URNS HPF: >10 /LPF (ref 0–5)
TIBC SERPL-MCNC: 333 UG/DL (ref 260–430)
TRANSFERRIN SERPL-MCNC: 238 MG/DL (ref 203–362)
TRIGL SERPL-MCNC: 228 MG/DL
WBC # BLD AUTO: 7.3 THOU/CMM (ref 4–10.5)
WBC #/AREA URNS HPF: ABNORMAL /HPF (ref 0–5)

## 2025-06-02 ENCOUNTER — OFFICE VISIT (OUTPATIENT)
Dept: INTERNAL MEDICINE CLINIC | Facility: OTHER | Age: OVER 89
End: 2025-06-02
Payer: COMMERCIAL

## 2025-06-02 VITALS
HEART RATE: 62 BPM | DIASTOLIC BLOOD PRESSURE: 88 MMHG | SYSTOLIC BLOOD PRESSURE: 124 MMHG | WEIGHT: 188.8 LBS | RESPIRATION RATE: 18 BRPM | OXYGEN SATURATION: 95 % | HEIGHT: 69 IN | BODY MASS INDEX: 27.96 KG/M2 | TEMPERATURE: 97.5 F

## 2025-06-02 DIAGNOSIS — Z00.00 MEDICARE ANNUAL WELLNESS VISIT, SUBSEQUENT: ICD-10-CM

## 2025-06-02 DIAGNOSIS — F33.41 RECURRENT MAJOR DEPRESSIVE DISORDER, IN PARTIAL REMISSION (HCC): ICD-10-CM

## 2025-06-02 DIAGNOSIS — M19.90 ARTHRITIS: ICD-10-CM

## 2025-06-02 DIAGNOSIS — M54.50 CHRONIC BILATERAL LOW BACK PAIN WITHOUT SCIATICA: ICD-10-CM

## 2025-06-02 DIAGNOSIS — Z12.5 SCREENING FOR PROSTATE CANCER: ICD-10-CM

## 2025-06-02 DIAGNOSIS — N18.2 STAGE 2 CHRONIC KIDNEY DISEASE: ICD-10-CM

## 2025-06-02 DIAGNOSIS — R82.998 URINE LEUKOCYTES: ICD-10-CM

## 2025-06-02 DIAGNOSIS — M47.16 OSTEOARTHRITIS OF LUMBAR SPINE WITH MYELOPATHY: ICD-10-CM

## 2025-06-02 DIAGNOSIS — Z13.6 SCREENING FOR CARDIOVASCULAR CONDITION: ICD-10-CM

## 2025-06-02 DIAGNOSIS — Z13.1 SCREENING FOR DIABETES MELLITUS: ICD-10-CM

## 2025-06-02 DIAGNOSIS — E43 UNSPECIFIED SEVERE PROTEIN-CALORIE MALNUTRITION (HCC): ICD-10-CM

## 2025-06-02 DIAGNOSIS — I10 PRIMARY HYPERTENSION: ICD-10-CM

## 2025-06-02 DIAGNOSIS — R35.0 BENIGN PROSTATIC HYPERPLASIA WITH URINARY FREQUENCY: ICD-10-CM

## 2025-06-02 DIAGNOSIS — G89.29 CHRONIC BILATERAL LOW BACK PAIN WITHOUT SCIATICA: ICD-10-CM

## 2025-06-02 DIAGNOSIS — D53.9 MACROCYTIC ANEMIA: ICD-10-CM

## 2025-06-02 DIAGNOSIS — M48.061 LUMBAR FORAMINAL STENOSIS: ICD-10-CM

## 2025-06-02 DIAGNOSIS — N18.31 STAGE 3A CHRONIC KIDNEY DISEASE (HCC): ICD-10-CM

## 2025-06-02 DIAGNOSIS — G31.84 MILD COGNITIVE IMPAIRMENT: ICD-10-CM

## 2025-06-02 DIAGNOSIS — Z71.89 ADVANCED CARE PLANNING/COUNSELING DISCUSSION: ICD-10-CM

## 2025-06-02 DIAGNOSIS — E55.9 VITAMIN D DEFICIENCY, UNSPECIFIED: ICD-10-CM

## 2025-06-02 DIAGNOSIS — R79.89 ELEVATED FERRITIN: ICD-10-CM

## 2025-06-02 DIAGNOSIS — E78.2 MIXED HYPERLIPIDEMIA: Primary | ICD-10-CM

## 2025-06-02 DIAGNOSIS — R26.81 UNSTEADY GAIT: ICD-10-CM

## 2025-06-02 DIAGNOSIS — E55.9 VITAMIN D DEFICIENCY: ICD-10-CM

## 2025-06-02 DIAGNOSIS — M80.00XD AGE-RELATED OSTEOPOROSIS WITH CURRENT PATHOLOGICAL FRACTURE WITH ROUTINE HEALING: ICD-10-CM

## 2025-06-02 DIAGNOSIS — N40.1 BENIGN PROSTATIC HYPERPLASIA WITH URINARY FREQUENCY: ICD-10-CM

## 2025-06-02 LAB
25(OH)D3+25(OH)D2 SERPL-MCNC: 50 NG/ML (ref 30–100)
BASOPHILS # BLD AUTO: 0.1 THOU/CMM (ref 0–0.1)
BASOPHILS NFR BLD AUTO: 1 %
CAOX CRY #/AREA URNS HPF: PRESENT /[HPF]
CRP SERPL-MCNC: 2.6 MG/L
DIFFERENTIAL METHOD BLD: ABNORMAL
EOSINOPHIL # BLD AUTO: 0.5 THOU/CMM (ref 0–0.5)
EOSINOPHIL NFR BLD AUTO: 7 %
ERYTHROCYTE [DISTWIDTH] IN BLOOD BY AUTOMATED COUNT: 14.4 % (ref 12–16)
GLUCOSE UR QL STRIP: NEGATIVE MG/DL
HCT VFR BLD AUTO: 39.4 % (ref 37–48)
HGB BLD-MCNC: 13.5 G/DL (ref 12.5–17)
HGB UR QL STRIP: NEGATIVE MG/DL
KETONES UR QL STRIP: ABNORMAL MG/DL
LEUKOCYTE ESTERASE UR QL STRIP: ABNORMAL /UL
LYMPHOCYTES # BLD AUTO: 2.7 THOU/CMM (ref 1–3)
LYMPHOCYTES NFR BLD AUTO: 36 %
MCH RBC QN AUTO: 36 PG (ref 27–36)
MCHC RBC AUTO-ENTMCNC: 34.4 G/DL (ref 32–37)
MCV RBC AUTO: 105 FL (ref 80–100)
MONOCYTES # BLD AUTO: 0.4 THOU/CMM (ref 0.3–1)
MONOCYTES NFR BLD AUTO: 6 %
MUCOUS THREADS URNS QL MICRO: ABNORMAL
NEUTROPHILS # BLD AUTO: 3.9 THOU/CMM (ref 1.8–7.8)
NEUTROPHILS NFR BLD AUTO: 50 %
NITRITE UR QL STRIP: POSITIVE
PH UR: 5 [PH] (ref 4.5–8)
PLATELET # BLD AUTO: 339 THOU/CMM (ref 140–350)
PMV BLD REES-ECKER: 7.5 FL (ref 7.5–11.3)
PROT 24H UR-MRATE: ABNORMAL MG/DL
PSA SERPL-MCNC: 2.44 NG/ML
RBC # BLD AUTO: 3.76 MILL/CMM (ref 4–5.4)
RBC #/AREA URNS HPF: ABNORMAL /HPF (ref 0–2)
SL AMB POCT URINE COMMENT: ABNORMAL
SP GR UR: 1.03 (ref 1–1.03)
SQUAMOUS #/AREA URNS HPF: >10 /LPF (ref 0–5)
T4 FREE SERPL-MCNC: <0.25 NG/DL (ref 0.61–1.12)
TSH SERPL-ACNC: 126.16 UIU/ML (ref 0.45–5.33)
VIT B12 SERPL-MCNC: 619 PG/ML (ref 180–914)
WBC # BLD AUTO: 7.6 THOU/CMM (ref 4–10.5)
WBC #/AREA URNS HPF: ABNORMAL /HPF (ref 0–5)

## 2025-06-02 PROCEDURE — 99214 OFFICE O/P EST MOD 30 MIN: CPT | Performed by: INTERNAL MEDICINE

## 2025-06-02 PROCEDURE — G2211 COMPLEX E/M VISIT ADD ON: HCPCS | Performed by: INTERNAL MEDICINE

## 2025-06-02 PROCEDURE — 99497 ADVNCD CARE PLAN 30 MIN: CPT | Performed by: INTERNAL MEDICINE

## 2025-06-02 PROCEDURE — G0439 PPPS, SUBSEQ VISIT: HCPCS | Performed by: INTERNAL MEDICINE

## 2025-06-02 RX ORDER — BUPROPION HYDROCHLORIDE 150 MG/1
150 TABLET ORAL EVERY MORNING
Qty: 100 TABLET | Refills: 1 | Status: SHIPPED | OUTPATIENT
Start: 2025-06-02 | End: 2026-05-28

## 2025-06-02 RX ORDER — ATORVASTATIN CALCIUM 20 MG/1
20 TABLET, FILM COATED ORAL DAILY
Qty: 100 TABLET | Refills: 1 | Status: SHIPPED | OUTPATIENT
Start: 2025-06-02

## 2025-06-02 RX ORDER — GABAPENTIN 100 MG/1
100 CAPSULE ORAL DAILY
Qty: 100 CAPSULE | Refills: 1 | Status: SHIPPED | OUTPATIENT
Start: 2025-06-02

## 2025-06-02 RX ORDER — DULOXETIN HYDROCHLORIDE 30 MG/1
30 CAPSULE, DELAYED RELEASE ORAL DAILY
Qty: 100 CAPSULE | Refills: 1 | Status: SHIPPED | OUTPATIENT
Start: 2025-06-02

## 2025-06-02 NOTE — ASSESSMENT & PLAN NOTE
Depression Screening Follow-up Plan: Patient's depression screening was positive with a PHQ-9 score of 6. Patient with underlying depression and was advised to continue current medications as prescribed. Patient advised to follow-up with PCP for further management.  Continue current regimen.  Stable.  Orders:  •  buPROPion (WELLBUTRIN XL) 150 mg 24 hr tablet; Take 1 tablet (150 mg total) by mouth every morning  •  DULoxetine (Cymbalta) 30 mg delayed release capsule; Take 1 capsule (30 mg total) by mouth in the morning.

## 2025-06-02 NOTE — PROGRESS NOTES
Name: Denton Manuel      : 1935      MRN: [id number]  Encounter Provider: Danny Roper MD  Encounter Date: 2025   Encounter department: [facility]  :  Assessment & Plan  Arthritis  Will refer for physical therapy.  Recommended use of cane for assistance with ambulation.  Orders:  •  CBC and differential; Future  •  Vitamin B12; Future  •  gabapentin (Neurontin) 100 mg capsule; Take 1 capsule (100 mg total) by mouth in the morning.  •  Ambulatory Referral to Physical Therapy; Future    Osteoarthritis of lumbar spine with myelopathy  Recommend that he take gabapentin daily in the morning as do not forget  Orders:  •  gabapentin (Neurontin) 100 mg capsule; Take 1 capsule (100 mg total) by mouth in the morning.    Lumbar foraminal stenosis    Orders:  •  gabapentin (Neurontin) 100 mg capsule; Take 1 capsule (100 mg total) by mouth in the morning.  •  DULoxetine (Cymbalta) 30 mg delayed release capsule; Take 1 capsule (30 mg total) by mouth in the morning.    Medicare annual wellness visit, subsequent  Medicare annual visit completed today.       Screening for diabetes mellitus         Screening for cardiovascular condition         Screening for prostate cancer    Orders:  •  PSA, Total Screen; Future    Mixed hyperlipidemia  Continue Lipitor 20 mg daily however recommend that he take earlier in the day as to not forget evening dose.  Orders:  •  atorvastatin (Lipitor) 20 mg tablet; Take 1 tablet (20 mg total) by mouth in the morning.    Stage 2 chronic kidney disease  Lab Results   Component Value Date    EGFR 72 2025    EGFR 73 2024    EGFR 72 10/16/2024    CREATININE 0.99 2025    CREATININE 0.98 2024    CREATININE 1 10/16/2024   Continue to trend kidney function.  Avoid nephrotoxic agents.       Primary hypertension  Controlled with current antihypertensive regimen.       Age-related osteoporosis with current pathological fracture with  routine healing  Continue Fosamax, calcium, and vitamin D.       Benign prostatic hyperplasia with urinary frequency  Continue finasteride and tamsulosin.       Recurrent major depressive disorder, in partial remission (HCC)  Depression Screening Follow-up Plan: Patient's depression screening was positive with a PHQ-9 score of 6. Patient with underlying depression and was advised to continue current medications as prescribed. Patient advised to follow-up with PCP for further management.  Continue current regimen.  Stable.  Orders:  •  buPROPion (WELLBUTRIN XL) 150 mg 24 hr tablet; Take 1 tablet (150 mg total) by mouth every morning  •  DULoxetine (Cymbalta) 30 mg delayed release capsule; Take 1 capsule (30 mg total) by mouth in the morning.    Macrocytic anemia  Continue to trend.    Orders:  •  CBC and differential; Future    Mild cognitive impairment  Continue supportive care.  Orders:  •  Vitamin B12; Future  •  Vitamin D 25 hydroxy; Future  •  TSH, 3rd generation with Free T4 reflex; Future    Vitamin D deficiency  Continue vitamin supplementation.       Elevated ferritin  Continue to trend.  Orders:  •  C-reactive protein; Future  •  TSH, 3rd generation with Free T4 reflex; Future    Urine leukocytes  Will recheck urinalysis with reflex to scope.  Orders:  •  UA (URINE) with reflex to Scope; Future    Stage 3a chronic kidney disease (HCC)  Lab Results   Component Value Date    EGFR 72 05/28/2025    EGFR 73 11/21/2024    EGFR 72 10/16/2024    CREATININE 0.99 05/28/2025    CREATININE 0.98 11/21/2024    CREATININE 1 10/16/2024          Unspecified severe protein-calorie malnutrition (HCC)    Orders:  •  Vitamin B12; Future    Vitamin D deficiency, unspecified    Orders:  •  Vitamin D 25 hydroxy; Future    Chronic bilateral low back pain without sciatica    Orders:  •  Ambulatory Referral to Physical Therapy; Future    Unsteady gait    Orders:  •  Ambulatory Referral to Physical Therapy; Future    Advanced care  planning/counseling discussion  Denton Manuel, his daughter, and I had a thorough discussion regarding advance care planning.  he  has a Living Will at home to which I recommended he provide a copy to be scanned for his medical records.  ACP documentation provided to patient today.  he  understands that today's visit is a billable service.  Refer to ACP note.    Serious Illness Conversation    1. What is your understanding now of where you are with your illness?  Prognostic Understanding: appropriate understanding of prognosis     2. How much information about what is likely to be ahead with your illness would you like to have?  Information: patient wants to be fully informed     3. What did you (clinician) communicate to the patient?     4. If your health situation worsens, what are your most important goals?  Goals: have my medical decisions respected, be mentally aware, be physically comfortable, be spiritually and emotionally at peace, be emotionally at peace, be at home     5. What are the biggest fears and worries about the future and your health?  Fears/Worries: loss of control, being a financial burden, being a physical burden, burdening others, being dependent     6. What abilities are so critical to your life that you cannot imagine living without them?  Unacceptable Function: being unconscious     7. What gives you strength as you think about the future with your illness?     8. If you become sicker, how much are you willing to go through for the possibility of gaining more time?  Be in the hospital: Yes Have a feeding tube: Yes   Be in the ICU: Yes Live in a nursing home: Yes   Be on a ventilator: Yes Be uncomfortable: Yes   Be on dialysis: Yes Undergo aggressive test and/or procedures: Yes   9. How much does your proxy and family know about your priorities and wishes?  Discussion Discussion: extensive discussion with family about goals and wishes        How does this plan sound to you? I will do  everything I can to help you through this.  Patient verbalized understanding of the plan     I have spent 16 minutes speaking with my patient on advanced care planning today or during this visit     Advanced directives  Five Wishes: Patient does not have Five Wishes- would like information                 Preventive health issues were discussed with patient, and age appropriate screening tests were ordered as noted in patient's After Visit Summary. Personalized health advice and appropriate referrals for health education or preventive services given if needed, as noted in patient's After Visit Summary.    History of Present Illness     Denton Manuel is seen today for follow up of chronic conditions. His daughter is present during today's appointment.   Recent laboratory studies reviewed today with the patient.   he has been compliant with his medication regimen.   He continue to have low back pain. He has been taking tylenol for pain. He forgets to take the gabapentin as he forgets to take his afternoon medications.   He has had a few falls over the past month. He denies any injuries. He cannot recall etiology of fall although whether mechanical but also reports feeling dizzy at times. He admits to drinking 2 cups of water, 2 cups of coffee, and 2 beers a day.     he has no complaints or concerns at this time.       Back Pain  Pertinent negatives include no abdominal pain, chest pain, dysuria or fever.   Heartburn  He reports no abdominal pain, no chest pain, no coughing, no nausea, no sore throat or no wheezing. Pertinent negatives include no fatigue.      Patient Care Team:  Danny Roper MD as PCP - General (Internal Medicine)    Review of Systems   Constitutional: Negative.  Negative for chills, fatigue and fever.   HENT:  Negative for congestion, ear pain, postnasal drip, rhinorrhea and sore throat.    Eyes: Negative.    Respiratory:  Negative for cough, chest tightness, shortness of breath and wheezing.     Cardiovascular:  Negative for chest pain and palpitations.   Gastrointestinal:  Negative for abdominal distention, abdominal pain, blood in stool, constipation, diarrhea and nausea.   Endocrine: Negative.    Genitourinary:  Negative for difficulty urinating, dysuria and hematuria.   Musculoskeletal:  Positive for back pain.   Skin: Negative.    Allergic/Immunologic: Negative for environmental allergies and food allergies.   Neurological: Negative.    Hematological:  Negative for adenopathy.   Psychiatric/Behavioral:  Negative for agitation, behavioral problems, confusion and sleep disturbance.    All other systems reviewed and are negative.    Medical History Reviewed by provider this encounter:  Tobacco  Allergies  Meds  Problems  Med Hx  Surg Hx  Fam Hx       Annual Wellness Visit Questionnaire   Denton is here for his Subsequent Wellness visit. Last Medicare Wellness visit information reviewed, patient interviewed and updates made to the record today.      Health Risk Assessment:   Patient rates overall health as good. Patient feels that their physical health rating is slightly worse. Patient is very satisfied with their life. Eyesight was rated as slightly worse. Hearing was rated as slightly worse. Patient feels that their emotional and mental health rating is slightly better. Patients states they are sometimes angry. Patient states they are sometimes unusually tired/fatigued. Pain experienced in the last 7 days has been a lot. Patient's pain rating has been 6/10. Patient states that he has experienced weight loss or gain in last 6 months.     Depression Screening:   PHQ-9 Score: 6      Fall Risk Screening:   In the past year, patient has experienced: history of falling in past year    Number of falls: 2 or more  Injured during fall?: Yes    Feels unsteady when standing or walking?: Yes    Worried about falling?: Yes      Home Safety:  Patient has trouble with stairs inside or outside of their home.  Patient has working smoke alarms and has no working carbon monoxide detector. Home safety hazards include: loose rugs on the floor.     Nutrition:   Current diet is Regular.     Medications:   Patient is currently taking over-the-counter supplements. OTC medications include: see medication list. Patient is not able to manage medications.     Activities of Daily Living (ADLs)/Instrumental Activities of Daily Living (IADLs):   Walk and transfer into and out of bed and chair?: Yes  Dress and groom yourself?: Yes    Bathe or shower yourself?: Yes    Feed yourself? Yes  Do your laundry/housekeeping?: Yes  Manage your money, pay your bills and track your expenses?: Yes  Make your own meals?: Yes    Do your own shopping?: Yes    ADL comments: Does struggle with dressing and grooming, with bathing and walking  Manages money with help    Previous Hospitalizations:   Any hospitalizations or ED visits within the last 12 months?: Yes    How many hospitalizations have you had in the last year?: 1-2    Advance Care Planning:   Living will: Yes    Durable POA for healthcare: Yes    Advanced directive: Yes    Advanced directive counseling given: Yes    End of Life Decisions reviewed with patient: Yes    Provider agrees with end of life decisions: Yes      Cognitive Screening:   Provider or family/friend/caregiver concerned regarding cognition?: No    Preventive Screenings      Cardiovascular Screening:    General: Screening Not Indicated, History Lipid Disorder and Risks and Benefits Discussed      Diabetes Screening:     General: Screening Current and Risks and Benefits Discussed      Colorectal Cancer Screening:     General: Screening Not Indicated      Prostate Cancer Screening:    General: Screening Not Indicated, Risks and Benefits Discussed and Screening Current      Osteoporosis Screening:    General: Screening Not Indicated, History Osteoporosis, Risks and Benefits Discussed and Screening Current      Abdominal Aortic  Aneurysm (AAA) Screening:    Risk factors include: tobacco use        Lung Cancer Screening:     General: Screening Not Indicated and Risks and Benefits Discussed      Hepatitis C Screening:    General: Risks and Benefits Discussed and Screening Current    Immunizations:    - Risks/benefits immunizations discussed      Screening, Brief Intervention, and Referral to Treatment (SBIRT)     Screening  Typical number of drinks in a day: 2  Typical number of drinks in a week: 10  Interpretation: Low risk drinking behavior.    Single Item Drug Screening:  How often have you used an illegal drug (including marijuana) or a prescription medication for non-medical reasons in the past year? never    Single Item Drug Screen Score: 0  Interpretation: Negative screen for possible drug use disorder    Brief Intervention  Alcohol & drug use screenings were reviewed. No concerns regarding substance use disorder identified.     Other Counseling Topics:   Car/seat belt/driving safety, skin self-exam, sunscreen and calcium and vitamin D intake and regular weightbearing exercise.     Social Drivers of Health     Financial Resource Strain: Low Risk  (10/16/2024)    Received from [facility]    Overall Financial Resource Strain (CARDIA)    • Difficulty of Paying Living Expenses: Not very hard   Food Insecurity: No Food Insecurity (6/2/2025)    Nursing - Inadequate Food Risk Classification    • Worried About Running Out of Food in the Last Year: Never true    • Ran Out of Food in the Last Year: Never true   Transportation Needs: No Transportation Needs (6/2/2025)    PRAPARE - Transportation    • Lack of Transportation (Medical): No    • Lack of Transportation (Non-Medical): No   Housing Stability: Low Risk  (6/2/2025)    Housing Stability Vital Sign    • Unable to Pay for Housing in the Last Year: No    • Number of Times Moved in the Last Year: 1    • Homeless in the Last Year: No   Utilities: Not At Risk (6/2/2025)    [facility]  "Utilities    • Threatened with loss of utilities: No     No results found.    Objective   /88 (BP Location: Left arm, Patient Position: Sitting, Cuff Size: Standard)   Pulse 62   Temp 97.5 °F (36.4 °C) (Temporal)   Resp 18   Ht 5' 9\" (1.753 m)   Wt 85.6 kg (188 lb 12.8 oz)   SpO2 95%   BMI 27.88 kg/m²     Physical Exam  Vitals and nursing note reviewed.   Constitutional:       General: He is not in acute distress.     Appearance: He is well-developed. He is not diaphoretic.   HENT:      Head: Normocephalic and atraumatic.     Eyes:      General: No scleral icterus.        Right eye: No discharge.         Left eye: No discharge.      Conjunctiva/sclera: Conjunctivae normal.      Pupils: Pupils are equal, round, and reactive to light.     Neck:      Thyroid: No thyromegaly.      Vascular: No JVD.     Cardiovascular:      Rate and Rhythm: Normal rate and regular rhythm.      Heart sounds: Normal heart sounds. No murmur heard.     No friction rub. No gallop.   Pulmonary:      Effort: Pulmonary effort is normal. No respiratory distress.      Breath sounds: Normal breath sounds. No wheezing or rales.   Chest:      Chest wall: No tenderness.   Abdominal:      General: Bowel sounds are normal. There is no distension.      Palpations: Abdomen is soft. There is no mass.      Tenderness: There is no abdominal tenderness. There is no guarding or rebound.     Musculoskeletal:         General: No tenderness or deformity. Normal range of motion.      Cervical back: Normal range of motion and neck supple.      Comments: Unsteadiness with ambulation.  Forward leaning posture   Lymphadenopathy:      Cervical: No cervical adenopathy.     Skin:     General: Skin is warm and dry.      Coloration: Skin is not pale.      Findings: No erythema or rash.     Neurological:      Mental Status: He is alert and oriented to person, place, and time.      Cranial Nerves: No cranial nerve deficit.      Coordination: Coordination normal. "      Deep Tendon Reflexes: Reflexes are normal and symmetric.     Psychiatric:         Behavior: Behavior normal.         Thought Content: Thought content normal.         Judgment: Judgment normal.

## 2025-06-02 NOTE — ASSESSMENT & PLAN NOTE
Denton Manuel, his daughter, and I had a thorough discussion regarding advance care planning.  he  has a Living Will at home to which I recommended he provide a copy to be scanned for his medical records.  ACP documentation provided to patient today.  he  understands that today's visit is a billable service.  Refer to ACP note.    Serious Illness Conversation    1. What is your understanding now of where you are with your illness?  Prognostic Understanding: appropriate understanding of prognosis     2. How much information about what is likely to be ahead with your illness would you like to have?  Information: patient wants to be fully informed     3. What did you (clinician) communicate to the patient?     4. If your health situation worsens, what are your most important goals?  Goals: have my medical decisions respected, be mentally aware, be physically comfortable, be spiritually and emotionally at peace, be emotionally at peace, be at home     5. What are the biggest fears and worries about the future and your health?  Fears/Worries: loss of control, being a financial burden, being a physical burden, burdening others, being dependent     6. What abilities are so critical to your life that you cannot imagine living without them?  Unacceptable Function: being unconscious     7. What gives you strength as you think about the future with your illness?     8. If you become sicker, how much are you willing to go through for the possibility of gaining more time?  Be in the hospital: Yes Have a feeding tube: Yes   Be in the ICU: Yes Live in a nursing home: Yes   Be on a ventilator: Yes Be uncomfortable: Yes   Be on dialysis: Yes Undergo aggressive test and/or procedures: Yes   9. How much does your proxy and family know about your priorities and wishes?  Discussion Discussion: extensive discussion with family about goals and wishes        How does this plan sound to you? I will do everything I can to help you through  this.  Patient verbalized understanding of the plan     I have spent 16 minutes speaking with my patient on advanced care planning today or during this visit     Advanced directives  Five Wishes: Patient does not have Five Wishes- would like information

## 2025-06-02 NOTE — ASSESSMENT & PLAN NOTE
Lab Results   Component Value Date    EGFR 72 05/28/2025    EGFR 73 11/21/2024    EGFR 72 10/16/2024    CREATININE 0.99 05/28/2025    CREATININE 0.98 11/21/2024    CREATININE 1 10/16/2024

## 2025-06-02 NOTE — ASSESSMENT & PLAN NOTE
Lab Results   Component Value Date    EGFR 72 05/28/2025    EGFR 73 11/21/2024    EGFR 72 10/16/2024    CREATININE 0.99 05/28/2025    CREATININE 0.98 11/21/2024    CREATININE 1 10/16/2024   Continue to trend kidney function.  Avoid nephrotoxic agents.

## 2025-06-02 NOTE — PATIENT INSTRUCTIONS
Medicare Preventive Visit Patient Instructions  Thank you for completing your Welcome to Medicare Visit or Medicare Annual Wellness Visit today. Your next wellness visit will be due in one year (6/3/2026).  The screening/preventive services that you may require over the next 5-10 years are detailed below. Some tests may not apply to you based off risk factors and/or age. Screening tests ordered at today's visit but not completed yet may show as past due. Also, please note that scanned in results may not display below.  Preventive Screenings:  Service Recommendations Previous Testing/Comments   Colorectal Cancer Screening  Colonoscopy    Fecal Occult Blood Test (FOBT)/Fecal Immunochemical Test (FIT)  Fecal DNA/Cologuard Test  Flexible Sigmoidoscopy Age: 45-75 years old   Colonoscopy: every 10 years (May be performed more frequently if at higher risk)  OR  FOBT/FIT: every 1 year  OR  Cologuard: every 3 years  OR  Sigmoidoscopy: every 5 years  Screening may be recommended earlier than age 45 if at higher risk for colorectal cancer. Also, an individualized decision between you and your healthcare provider will decide whether screening between the ages of 76-85 would be appropriate. Colonoscopy: Not on file  FOBT/FIT: Not on file  Cologuard: Not on file  Sigmoidoscopy: Not on file    Screening Not Indicated     Prostate Cancer Screening Individualized decision between patient and health care provider in men between ages of 55-69   Medicare will cover every 12 months beginning on the day after your 50th birthday PSA: No results in last 5 years     Screening Not Indicated     Hepatitis C Screening Once for adults born between 1945 and 1965  More frequently in patients at high risk for Hepatitis C Hep C Antibody: Not on file        Diabetes Screening 1-2 times per year if you're at risk for diabetes or have pre-diabetes Fasting glucose: 104 mg/dL (10/11/2024)  A1C: 5.7 % (5/28/2025)  Screening Current   Cholesterol  Screening Once every 5 years if you don't have a lipid disorder. May order more often based on risk factors. Lipid panel: 05/28/2025  Screening Not Indicated  History Lipid Disorder      Other Preventive Screenings Covered by Medicare:  Abdominal Aortic Aneurysm (AAA) Screening: covered once if your at risk. You're considered to be at risk if you have a family history of AAA or a male between the age of 65-75 who smoking at least 100 cigarettes in your lifetime.  Lung Cancer Screening: covers low dose CT scan once per year if you meet all of the following conditions: (1) Age 55-77; (2) No signs or symptoms of lung cancer; (3) Current smoker or have quit smoking within the last 15 years; (4) You have a tobacco smoking history of at least 20 pack years (packs per day x number of years you smoked); (5) You get a written order from a healthcare provider.  Glaucoma Screening: covered annually if you're considered high risk: (1) You have diabetes OR (2) Family history of glaucoma OR (3)  aged 50 and older OR (4)  American aged 65 and older  Osteoporosis Screening: covered every 2 years if you meet one of the following conditions: (1) Have a vertebral abnormality; (2) On glucocorticoid therapy for more than 3 months; (3) Have primary hyperparathyroidism; (4) On osteoporosis medications and need to assess response to drug therapy.  HIV Screening: covered annually if you're between the age of 15-65. Also covered annually if you are younger than 15 and older than 65 with risk factors for HIV infection. For pregnant patients, it is covered up to 3 times per pregnancy.    Immunizations:  Immunization Recommendations   Influenza Vaccine Annual influenza vaccination during flu season is recommended for all persons aged >= 6 months who do not have contraindications   Pneumococcal Vaccine   * Pneumococcal conjugate vaccine = PCV13 (Prevnar 13), PCV15 (Vaxneuvance), PCV20 (Prevnar 20)  * Pneumococcal  polysaccharide vaccine = PPSV23 (Pneumovax) Adults 19-65 yo with certain risk factors or if 65+ yo  If never received any pneumonia vaccine: recommend Prevnar 20 (PCV20)  Give PCV20 if previously received 1 dose of PCV13 or PPSV23   Hepatitis B Vaccine 3 dose series if at intermediate or high risk (ex: diabetes, end stage renal disease, liver disease)   Respiratory syncytial virus (RSV) Vaccine - COVERED BY MEDICARE PART D  * RSVPreF3 (Arexvy) CDC recommends that adults 60 years of age and older may receive a single dose of RSV vaccine using shared clinical decision-making (SCDM)   Tetanus (Td) Vaccine - COST NOT COVERED BY MEDICARE PART B Following completion of primary series, a booster dose should be given every 10 years to maintain immunity against tetanus. Td may also be given as tetanus wound prophylaxis.   Tdap Vaccine - COST NOT COVERED BY MEDICARE PART B Recommended at least once for all adults. For pregnant patients, recommended with each pregnancy.   Shingles Vaccine (Shingrix) - COST NOT COVERED BY MEDICARE PART B  2 shot series recommended in those 19 years and older who have or will have weakened immune systems or those 50 years and older     Health Maintenance Due:  There are no preventive care reminders to display for this patient.  Immunizations Due:  There are no preventive care reminders to display for this patient.  Advance Directives   What are advance directives?  Advance directives are legal documents that state your wishes and plans for medical care. These plans are made ahead of time in case you lose your ability to make decisions for yourself. Advance directives can apply to any medical decision, such as the treatments you want, and if you want to donate organs.   What are the types of advance directives?  There are many types of advance directives, and each state has rules about how to use them. You may choose a combination of any of the following:  Living will:  This is a written record  of the treatment you want. You can also choose which treatments you do not want, which to limit, and which to stop at a certain time. This includes surgery, medicine, IV fluid, and tube feedings.   Durable power of  for healthcare (DPAHC):  This is a written record that states who you want to make healthcare choices for you when you are unable to make them for yourself. This person, called a proxy, is usually a family member or a friend. You may choose more than 1 proxy.  Do not resuscitate (DNR) order:  A DNR order is used in case your heart stops beating or you stop breathing. It is a request not to have certain forms of treatment, such as CPR. A DNR order may be included in other types of advance directives.  Medical directive:  This covers the care that you want if you are in a coma, near death, or unable to make decisions for yourself. You can list the treatments you want for each condition. Treatment may include pain medicine, surgery, blood transfusions, dialysis, IV or tube feedings, and a ventilator (breathing machine).  Values history:  This document has questions about your views, beliefs, and how you feel and think about life. This information can help others choose the care that you would choose.  Why are advance directives important?  An advance directive helps you control your care. Although spoken wishes may be used, it is better to have your wishes written down. Spoken wishes can be misunderstood, or not followed. Treatments may be given even if you do not want them. An advance directive may make it easier for your family to make difficult choices about your care.   Fall Prevention    Fall prevention  includes ways to make your home and other areas safer. It also includes ways you can move more carefully to prevent a fall. Health conditions that cause changes in your blood pressure, vision, or muscle strength and coordination may increase your risk for falls. Medicines may also increase your  risk for falls if they make you dizzy, weak, or sleepy.   Fall prevention tips:   Stand or sit up slowly.    Use assistive devices as directed.    Wear shoes that fit well and have soles that .    Wear a personal alarm.    Stay active.    Manage your medical conditions.    Home Safety Tips:  Add items to prevent falls in the bathroom.    Keep paths clear.    Install bright lights in your home.    Keep items you use often on shelves within reach.    Paint or place reflective tape on the edges of your stairs.    Cigarette Smoking and Your Health   Risks to your health if you smoke:  Nicotine and other chemicals found in tobacco damage every cell in your body. Even if you are a light smoker, you have an increased risk for cancer, heart disease, and lung disease. If you are pregnant or have diabetes, smoking increases your risk for complications.   Benefits to your health if you stop smoking:   You decrease respiratory symptoms such as coughing, wheezing, and shortness of breath.   You reduce your risk for cancers of the lung, mouth, throat, kidney, bladder, pancreas, stomach, and cervix. If you already have cancer, you increase the benefits of chemotherapy. You also reduce your risk for cancer returning or a second cancer from developing.   You reduce your risk for heart disease, blood clots, heart attack, and stroke.   You reduce your risk for lung infections, and diseases such as pneumonia, asthma, chronic bronchitis, and emphysema.  Your circulation improves. More oxygen can be delivered to your body. If you have diabetes, you lower your risk for complications, such as kidney, artery, and eye diseases. You also lower your risk for nerve damage. Nerve damage can lead to amputations, poor vision, and blindness.  You improve your body's ability to heal and to fight infections.  For more information and support to stop smoking:   Smokefree.gov  Phone: 8- 531 - 888-2204  Web Address: www.[place].me.gov  Weight  Management   Why it is important to manage your weight:  Being overweight increases your risk of health conditions such as heart disease, high blood pressure, type 2 diabetes, and certain types of cancer. It can also increase your risk for osteoarthritis, sleep apnea, and other respiratory problems. Aim for a slow, steady weight loss. Even a small amount of weight loss can lower your risk of health problems.  How to lose weight safely:  A safe and healthy way to lose weight is to eat fewer calories and get regular exercise. You can lose up about 1 pound a week by decreasing the number of calories you eat by 500 calories each day.   Healthy meal plan for weight management:  A healthy meal plan includes a variety of foods, contains fewer calories, and helps you stay healthy. A healthy meal plan includes the following:  Eat whole-grain foods more often.  A healthy meal plan should contain fiber. Fiber is the part of grains, fruits, and vegetables that is not broken down by your body. Whole-grain foods are healthy and provide extra fiber in your diet. Some examples of whole-grain foods are whole-wheat breads and pastas, oatmeal, brown rice, and bulgur.  Eat a variety of vegetables every day.  Include dark, leafy greens such as spinach, kale, ajay greens, and mustard greens. Eat yellow and orange vegetables such as carrots, sweet potatoes, and winter squash.   Eat a variety of fruits every day.  Choose fresh or canned fruit (canned in its own juice or light syrup) instead of juice. Fruit juice has very little or no fiber.  Eat low-fat dairy foods.  Drink fat-free (skim) milk or 1% milk. Eat fat-free yogurt and low-fat cottage cheese. Try low-fat cheeses such as mozzarella and other reduced-fat cheeses.  Choose meat and other protein foods that are low in fat.  Choose beans or other legumes such as split peas or lentils. Choose fish, skinless poultry (chicken or turkey), or lean cuts of red meat (beef or pork). Before  you cook meat or poultry, cut off any visible fat.   Use less fat and oil.  Try baking foods instead of frying them. Add less fat, such as margarine, sour cream, regular salad dressing and mayonnaise to foods. Eat fewer high-fat foods. Some examples of high-fat foods include french fries, doughnuts, ice cream, and cakes.  Eat fewer sweets.  Limit foods and drinks that are high in sugar. This includes candy, cookies, regular soda, and sweetened drinks.  Exercise:  Exercise at least 30 minutes per day on most days of the week. Some examples of exercise include walking, biking, dancing, and swimming. You can also fit in more physical activity by taking the stairs instead of the elevator or parking farther away from stores. Ask your healthcare provider about the best exercise plan for you.    © Copyright [employer] 2018 Information is for End User's use only and may not be sold, redistributed or otherwise used for commercial purposes. All illustrations and images included in CareNotes® are the copyrighted property of [employer] or [employer]

## 2025-06-02 NOTE — ASSESSMENT & PLAN NOTE
Continue Lipitor 20 mg daily however recommend that he take earlier in the day as to not forget evening dose.  Orders:  •  atorvastatin (Lipitor) 20 mg tablet; Take 1 tablet (20 mg total) by mouth in the morning.

## 2025-06-02 NOTE — ASSESSMENT & PLAN NOTE
Will refer for physical therapy.  Recommended use of cane for assistance with ambulation.  Orders:  •  CBC and differential; Future  •  Vitamin B12; Future  •  gabapentin (Neurontin) 100 mg capsule; Take 1 capsule (100 mg total) by mouth in the morning.  •  Ambulatory Referral to Physical Therapy; Future

## 2025-06-09 ENCOUNTER — RESULTS FOLLOW-UP (OUTPATIENT)
Dept: INTERNAL MEDICINE CLINIC | Facility: OTHER | Age: OVER 89
End: 2025-06-09

## 2025-06-09 DIAGNOSIS — R31.29 MICROSCOPIC HEMATURIA: ICD-10-CM

## 2025-06-09 DIAGNOSIS — R39.9 UTI SYMPTOMS: ICD-10-CM

## 2025-06-09 DIAGNOSIS — R82.998 URINE LEUKOCYTES: Primary | ICD-10-CM

## 2025-06-09 RX ORDER — CEPHALEXIN 500 MG/1
500 CAPSULE ORAL 2 TIMES DAILY
Qty: 14 CAPSULE | Refills: 0 | Status: SHIPPED | OUTPATIENT
Start: 2025-06-09 | End: 2025-06-16

## 2025-06-11 ENCOUNTER — TELEPHONE (OUTPATIENT)
Age: OVER 89
End: 2025-06-11

## 2025-06-11 DIAGNOSIS — E03.9 ACQUIRED HYPOTHYROIDISM: Primary | ICD-10-CM

## 2025-06-11 RX ORDER — LEVOTHYROXINE SODIUM 50 UG/1
50 TABLET ORAL
Qty: 90 TABLET | Refills: 0 | Status: SHIPPED | OUTPATIENT
Start: 2025-06-11

## 2025-06-11 NOTE — TELEPHONE ENCOUNTER
Patient daughter send a message [email or web address] the [facility] about the patient lab work result. Please have the doctor follow up with the daughter about the patient lab result. Thank you

## 2025-06-11 NOTE — TELEPHONE ENCOUNTER
Left message on machine for patient's daughter to call back for results & instructions from pcp    Message received from pcp via Secure Chat:  Please contact patient's daughter, Vanessa, to inform her that I reviewed the results of her father's urine studies to which he was found to have nitrites, leukocytes, protein, and crystals in his urine. I was awaiting for the microscopy and culture however it does not appear that these were done, please contact the lab to inquire status on reflex lab studies.  I would like to start him on Keflex twice a day for 7 days for possible urinary tract infection.  Thank you.     Will wait to see if pcp has comment regarding all of the other labs that were also collected on the same day as urine.     UPDATE from Dr. Roper:  Please also let her know that I sent in a prescription for levothyroxine 50 mcg daily to be taken in the morning with water only and 30-45 minutes before any other medications or food/beverages. I would like to recheck his thyroid levels in 6 weeks.    Positioning (Leave Blank If You Do Not Want): The patient was placed in a comfortable position exposing the surgical site.

## 2025-06-12 NOTE — TELEPHONE ENCOUNTER
Spoke with Sosa, patient's daughter, answered all questions.  She is agreeable with treatment plan.  She will schedule ultrasound of kidneys and bladder.

## 2025-06-23 ENCOUNTER — TELEPHONE (OUTPATIENT)
Dept: INTERNAL MEDICINE CLINIC | Facility: OTHER | Age: OVER 89
End: 2025-06-23

## 2025-06-23 ENCOUNTER — HOSPITAL ENCOUNTER (OUTPATIENT)
Dept: ULTRASOUND IMAGING | Facility: HOSPITAL | Age: OVER 89
Discharge: HOME | End: 2025-06-23
Payer: COMMERCIAL

## 2025-06-23 DIAGNOSIS — R16.0 MASS OF RIGHT LOBE OF LIVER: Primary | ICD-10-CM

## 2025-06-23 DIAGNOSIS — R31.29 MICROSCOPIC HEMATURIA: ICD-10-CM

## 2025-06-23 DIAGNOSIS — R82.998 URINE LEUKOCYTES: ICD-10-CM

## 2025-06-23 PROCEDURE — 76775 US EXAM ABDO BACK WALL LIM: CPT

## 2025-06-23 NOTE — TELEPHONE ENCOUNTER
Spoke with radiology.  Ultrasound kidney and bladder showed underdistended bladder, otherwise unremarkable    There was noted to be an incidental finding of large mass of liver    Spoke with patient's daughter Vanessa, and relayed information regarding ultrasound.  Discussed that there was a large mass on the liver, unable to be characterized on ultrasound.  MRI is recommended.    Order placed in chart for MRI.  Vanessa will discuss with her father, the patient, to discuss if he would like to pursue MRI at this time given his age.  She is in understanding at that mass could be malignant or benign, and we are unable to determine until further imaging is obtained.      Patient's daughter will contact our office with their decision but they were advised that order is already placed in chart if he would like to complete

## 2025-07-28 ENCOUNTER — NURSING HOME VISIT (OUTPATIENT)
Dept: GERIATRICS | Facility: OTHER | Age: OVER 89
End: 2025-07-28
Payer: COMMERCIAL

## 2025-07-28 DIAGNOSIS — F32.A DEPRESSION, UNSPECIFIED DEPRESSION TYPE: ICD-10-CM

## 2025-07-28 DIAGNOSIS — L02.11 NECK ABSCESS: Primary | ICD-10-CM

## 2025-07-28 DIAGNOSIS — E03.9 HYPOTHYROIDISM, UNSPECIFIED TYPE: ICD-10-CM

## 2025-07-28 DIAGNOSIS — S06.5XAA SUBDURAL HEMATOMA (HCC): ICD-10-CM

## 2025-07-28 DIAGNOSIS — M19.90 ARTHRITIS: ICD-10-CM

## 2025-07-28 DIAGNOSIS — D53.9 MACROCYTIC ANEMIA: ICD-10-CM

## 2025-07-28 DIAGNOSIS — M48.061 LUMBAR FORAMINAL STENOSIS: ICD-10-CM

## 2025-07-28 DIAGNOSIS — I47.29 NSVT (NONSUSTAINED VENTRICULAR TACHYCARDIA) (HCC): ICD-10-CM

## 2025-07-28 DIAGNOSIS — N40.0 BENIGN PROSTATIC HYPERPLASIA, UNSPECIFIED WHETHER LOWER URINARY TRACT SYMPTOMS PRESENT: ICD-10-CM

## 2025-07-28 DIAGNOSIS — R91.1 PULMONARY NODULE: ICD-10-CM

## 2025-07-28 DIAGNOSIS — I10 PRIMARY HYPERTENSION: ICD-10-CM

## 2025-07-28 DIAGNOSIS — E78.2 MIXED HYPERLIPIDEMIA: ICD-10-CM

## 2025-07-28 DIAGNOSIS — R26.2 AMBULATORY DYSFUNCTION: ICD-10-CM

## 2025-07-28 DIAGNOSIS — N18.2 STAGE 2 CHRONIC KIDNEY DISEASE: ICD-10-CM

## 2025-07-28 DIAGNOSIS — G93.41 ACUTE METABOLIC ENCEPHALOPATHY: ICD-10-CM

## 2025-07-28 PROBLEM — F10.90 ALCOHOL USE: Status: ACTIVE | Noted: 2025-07-22

## 2025-07-28 PROBLEM — R00.0 WIDE-COMPLEX TACHYCARDIA: Status: ACTIVE | Noted: 2025-07-22

## 2025-07-28 PROBLEM — J98.8 AIRWAY COMPROMISE: Status: ACTIVE | Noted: 2025-07-17

## 2025-07-28 PROCEDURE — 99306 1ST NF CARE HIGH MDM 50: CPT | Performed by: INTERNAL MEDICINE

## 2025-07-28 RX ORDER — DULOXETIN HYDROCHLORIDE 20 MG/1
20 CAPSULE, DELAYED RELEASE ORAL DAILY
COMMUNITY

## 2025-07-28 RX ORDER — DEXAMETHASONE 2 MG/1
2 TABLET ORAL DAILY
COMMUNITY
Start: 2025-07-25

## 2025-07-28 RX ORDER — SACCHAROMYCES BOULARDII 250 MG
250 CAPSULE ORAL 2 TIMES DAILY
COMMUNITY
End: 2025-08-05

## 2025-07-28 RX ORDER — PANTOPRAZOLE SODIUM 40 MG/1
40 TABLET, DELAYED RELEASE ORAL DAILY
COMMUNITY

## 2025-08-01 ENCOUNTER — NURSING HOME VISIT (OUTPATIENT)
Dept: GERIATRICS | Facility: OTHER | Age: OVER 89
End: 2025-08-01
Payer: COMMERCIAL

## 2025-08-01 DIAGNOSIS — D72.829 LEUKOCYTOSIS, UNSPECIFIED TYPE: ICD-10-CM

## 2025-08-01 DIAGNOSIS — D53.9 MACROCYTIC ANEMIA: ICD-10-CM

## 2025-08-01 DIAGNOSIS — G93.41 ACUTE METABOLIC ENCEPHALOPATHY: Primary | ICD-10-CM

## 2025-08-01 DIAGNOSIS — F32.0 CURRENT MILD EPISODE OF MAJOR DEPRESSIVE DISORDER, UNSPECIFIED WHETHER RECURRENT (HCC): ICD-10-CM

## 2025-08-01 DIAGNOSIS — I10 PRIMARY HYPERTENSION: ICD-10-CM

## 2025-08-01 DIAGNOSIS — N18.2 STAGE 2 CHRONIC KIDNEY DISEASE: ICD-10-CM

## 2025-08-01 PROCEDURE — 99309 SBSQ NF CARE MODERATE MDM 30: CPT

## 2025-08-03 VITALS
OXYGEN SATURATION: 97 % | WEIGHT: 188.2 LBS | DIASTOLIC BLOOD PRESSURE: 77 MMHG | BODY MASS INDEX: 27.79 KG/M2 | RESPIRATION RATE: 20 BRPM | TEMPERATURE: 97.5 F | SYSTOLIC BLOOD PRESSURE: 136 MMHG | HEART RATE: 68 BPM

## 2025-08-03 PROBLEM — D72.829 LEUKOCYTOSIS: Status: ACTIVE | Noted: 2025-08-03

## 2025-08-05 ENCOUNTER — NURSING HOME VISIT (OUTPATIENT)
Dept: GERIATRICS | Facility: OTHER | Age: OVER 89
End: 2025-08-05
Payer: COMMERCIAL

## 2025-08-05 VITALS
BODY MASS INDEX: 27.79 KG/M2 | HEART RATE: 72 BPM | DIASTOLIC BLOOD PRESSURE: 88 MMHG | WEIGHT: 188.2 LBS | SYSTOLIC BLOOD PRESSURE: 132 MMHG | RESPIRATION RATE: 20 BRPM | OXYGEN SATURATION: 97 % | TEMPERATURE: 97.5 F

## 2025-08-05 DIAGNOSIS — L02.11 NECK ABSCESS: ICD-10-CM

## 2025-08-05 DIAGNOSIS — S06.5XAA SUBDURAL HEMATOMA (HCC): ICD-10-CM

## 2025-08-05 DIAGNOSIS — M48.061 LUMBAR FORAMINAL STENOSIS: ICD-10-CM

## 2025-08-05 DIAGNOSIS — F32.0 CURRENT MILD EPISODE OF MAJOR DEPRESSIVE DISORDER, UNSPECIFIED WHETHER RECURRENT (HCC): ICD-10-CM

## 2025-08-05 DIAGNOSIS — D53.9 MACROCYTIC ANEMIA: ICD-10-CM

## 2025-08-05 DIAGNOSIS — N40.0 BENIGN PROSTATIC HYPERPLASIA WITHOUT LOWER URINARY TRACT SYMPTOMS: ICD-10-CM

## 2025-08-05 DIAGNOSIS — I10 PRIMARY HYPERTENSION: Primary | ICD-10-CM

## 2025-08-05 DIAGNOSIS — R26.2 AMBULATORY DYSFUNCTION: ICD-10-CM

## 2025-08-05 PROCEDURE — 99309 SBSQ NF CARE MODERATE MDM 30: CPT

## 2025-08-11 ENCOUNTER — NURSING HOME VISIT (OUTPATIENT)
Dept: GERIATRICS | Facility: OTHER | Age: OVER 89
End: 2025-08-11
Payer: COMMERCIAL

## 2025-08-13 ENCOUNTER — NURSING HOME VISIT (OUTPATIENT)
Dept: GERIATRICS | Facility: OTHER | Age: OVER 89
End: 2025-08-13
Payer: COMMERCIAL

## 2025-08-13 PROBLEM — M25.471 ANKLE EDEMA, BILATERAL: Status: ACTIVE | Noted: 2025-08-13

## 2025-08-13 PROBLEM — M25.472 ANKLE EDEMA, BILATERAL: Status: ACTIVE | Noted: 2025-08-13

## 2025-08-18 ENCOUNTER — TELEPHONE (OUTPATIENT)
Age: OVER 89
End: 2025-08-18